# Patient Record
Sex: FEMALE | Race: WHITE | NOT HISPANIC OR LATINO
[De-identification: names, ages, dates, MRNs, and addresses within clinical notes are randomized per-mention and may not be internally consistent; named-entity substitution may affect disease eponyms.]

---

## 2017-02-17 ENCOUNTER — APPOINTMENT (OUTPATIENT)
Dept: OBGYN | Facility: CLINIC | Age: 82
End: 2017-02-17

## 2017-02-17 VITALS — DIASTOLIC BLOOD PRESSURE: 82 MMHG | SYSTOLIC BLOOD PRESSURE: 173 MMHG | HEIGHT: 59 IN

## 2017-02-17 DIAGNOSIS — E79.0 HYPERURICEMIA W/OUT SIGNS OF INFLAMMATORY ARTHRITIS AND TOPHACEOUS DISEASE: ICD-10-CM

## 2017-02-17 DIAGNOSIS — Z46.89 ENCOUNTER FOR FITTING AND ADJUSTMENT OF OTHER SPECIFIED DEVICES: ICD-10-CM

## 2017-07-07 ENCOUNTER — APPOINTMENT (OUTPATIENT)
Dept: OBGYN | Facility: CLINIC | Age: 82
End: 2017-07-07

## 2017-07-07 VITALS
HEART RATE: 75 BPM | HEIGHT: 59 IN | WEIGHT: 137 LBS | DIASTOLIC BLOOD PRESSURE: 74 MMHG | BODY MASS INDEX: 27.62 KG/M2 | SYSTOLIC BLOOD PRESSURE: 154 MMHG

## 2017-12-15 ENCOUNTER — APPOINTMENT (OUTPATIENT)
Dept: OBGYN | Facility: CLINIC | Age: 82
End: 2017-12-15
Payer: MEDICARE

## 2017-12-15 VITALS
SYSTOLIC BLOOD PRESSURE: 173 MMHG | WEIGHT: 140 LBS | HEIGHT: 59 IN | DIASTOLIC BLOOD PRESSURE: 86 MMHG | BODY MASS INDEX: 28.22 KG/M2

## 2017-12-15 DIAGNOSIS — Z87.448 PERSONAL HISTORY OF OTHER DISEASES OF URINARY SYSTEM: ICD-10-CM

## 2017-12-15 DIAGNOSIS — Z46.89 ENCOUNTER FOR FITTING AND ADJUSTMENT OF OTHER SPECIFIED DEVICES: ICD-10-CM

## 2017-12-15 PROCEDURE — 99213 OFFICE O/P EST LOW 20 MIN: CPT

## 2018-09-04 ENCOUNTER — INPATIENT (INPATIENT)
Facility: HOSPITAL | Age: 83
LOS: 0 days | Discharge: HOME | DRG: 392 | End: 2018-09-05
Attending: INTERNAL MEDICINE | Admitting: HOSPITALIST
Payer: MEDICARE

## 2018-09-04 VITALS
HEART RATE: 60 BPM | OXYGEN SATURATION: 98 % | WEIGHT: 134.92 LBS | DIASTOLIC BLOOD PRESSURE: 87 MMHG | TEMPERATURE: 98 F | HEIGHT: 59 IN | SYSTOLIC BLOOD PRESSURE: 176 MMHG | RESPIRATION RATE: 15 BRPM

## 2018-09-04 DIAGNOSIS — K21.9 GASTRO-ESOPHAGEAL REFLUX DISEASE WITHOUT ESOPHAGITIS: ICD-10-CM

## 2018-09-04 DIAGNOSIS — R11.2 NAUSEA WITH VOMITING, UNSPECIFIED: ICD-10-CM

## 2018-09-04 DIAGNOSIS — Z29.9 ENCOUNTER FOR PROPHYLACTIC MEASURES, UNSPECIFIED: ICD-10-CM

## 2018-09-04 DIAGNOSIS — G25.81 RESTLESS LEGS SYNDROME: ICD-10-CM

## 2018-09-04 DIAGNOSIS — E87.1 HYPO-OSMOLALITY AND HYPONATREMIA: ICD-10-CM

## 2018-09-04 DIAGNOSIS — I25.10 ATHEROSCLEROTIC HEART DISEASE OF NATIVE CORONARY ARTERY WITHOUT ANGINA PECTORIS: ICD-10-CM

## 2018-09-04 DIAGNOSIS — I10 ESSENTIAL (PRIMARY) HYPERTENSION: ICD-10-CM

## 2018-09-04 DIAGNOSIS — E03.9 HYPOTHYROIDISM, UNSPECIFIED: ICD-10-CM

## 2018-09-04 LAB
ALBUMIN SERPL ELPH-MCNC: 3.3 G/DL — SIGNIFICANT CHANGE UP (ref 3.3–5)
ALP SERPL-CCNC: 65 U/L — SIGNIFICANT CHANGE UP (ref 40–120)
ALT FLD-CCNC: 19 U/L — SIGNIFICANT CHANGE UP (ref 12–78)
ANION GAP SERPL CALC-SCNC: 8 MMOL/L — SIGNIFICANT CHANGE UP (ref 5–17)
APTT BLD: 24.5 SEC — LOW (ref 27.5–37.4)
AST SERPL-CCNC: 22 U/L — SIGNIFICANT CHANGE UP (ref 15–37)
BASOPHILS # BLD AUTO: 0.03 K/UL — SIGNIFICANT CHANGE UP (ref 0–0.2)
BASOPHILS NFR BLD AUTO: 0.3 % — SIGNIFICANT CHANGE UP (ref 0–2)
BILIRUB SERPL-MCNC: 0.4 MG/DL — SIGNIFICANT CHANGE UP (ref 0.2–1.2)
BUN SERPL-MCNC: 10 MG/DL — SIGNIFICANT CHANGE UP (ref 7–23)
CALCIUM SERPL-MCNC: 8.3 MG/DL — LOW (ref 8.5–10.1)
CHLORIDE SERPL-SCNC: 98 MMOL/L — SIGNIFICANT CHANGE UP (ref 96–108)
CK MB BLD-MCNC: 1.7 % — SIGNIFICANT CHANGE UP (ref 0–3.5)
CK MB CFR SERPL CALC: 1.8 NG/ML — SIGNIFICANT CHANGE UP (ref 0–3.6)
CK SERPL-CCNC: 107 U/L — SIGNIFICANT CHANGE UP (ref 26–192)
CO2 SERPL-SCNC: 23 MMOL/L — SIGNIFICANT CHANGE UP (ref 22–31)
CREAT SERPL-MCNC: 0.83 MG/DL — SIGNIFICANT CHANGE UP (ref 0.5–1.3)
EOSINOPHIL # BLD AUTO: 0.02 K/UL — SIGNIFICANT CHANGE UP (ref 0–0.5)
EOSINOPHIL NFR BLD AUTO: 0.2 % — SIGNIFICANT CHANGE UP (ref 0–6)
GLUCOSE SERPL-MCNC: 118 MG/DL — HIGH (ref 70–99)
HCT VFR BLD CALC: 33.2 % — LOW (ref 34.5–45)
HGB BLD-MCNC: 11.7 G/DL — SIGNIFICANT CHANGE UP (ref 11.5–15.5)
IMM GRANULOCYTES NFR BLD AUTO: 0.6 % — SIGNIFICANT CHANGE UP (ref 0–1.5)
INR BLD: 1.03 RATIO — SIGNIFICANT CHANGE UP (ref 0.88–1.16)
LIDOCAIN IGE QN: 125 U/L — SIGNIFICANT CHANGE UP (ref 73–393)
LYMPHOCYTES # BLD AUTO: 1.08 K/UL — SIGNIFICANT CHANGE UP (ref 1–3.3)
LYMPHOCYTES # BLD AUTO: 12.6 % — LOW (ref 13–44)
MCHC RBC-ENTMCNC: 29.8 PG — SIGNIFICANT CHANGE UP (ref 27–34)
MCHC RBC-ENTMCNC: 35.2 GM/DL — SIGNIFICANT CHANGE UP (ref 32–36)
MCV RBC AUTO: 84.5 FL — SIGNIFICANT CHANGE UP (ref 80–100)
MONOCYTES # BLD AUTO: 0.45 K/UL — SIGNIFICANT CHANGE UP (ref 0–0.9)
MONOCYTES NFR BLD AUTO: 5.2 % — SIGNIFICANT CHANGE UP (ref 2–14)
NEUTROPHILS # BLD AUTO: 6.96 K/UL — SIGNIFICANT CHANGE UP (ref 1.8–7.4)
NEUTROPHILS NFR BLD AUTO: 81.1 % — HIGH (ref 43–77)
PLATELET # BLD AUTO: 202 K/UL — SIGNIFICANT CHANGE UP (ref 150–400)
POTASSIUM SERPL-MCNC: 4.1 MMOL/L — SIGNIFICANT CHANGE UP (ref 3.5–5.3)
POTASSIUM SERPL-SCNC: 4.1 MMOL/L — SIGNIFICANT CHANGE UP (ref 3.5–5.3)
PROT SERPL-MCNC: 6.6 G/DL — SIGNIFICANT CHANGE UP (ref 6–8.3)
PROTHROM AB SERPL-ACNC: 11.2 SEC — SIGNIFICANT CHANGE UP (ref 9.8–12.7)
RBC # BLD: 3.93 M/UL — SIGNIFICANT CHANGE UP (ref 3.8–5.2)
RBC # FLD: 12.9 % — SIGNIFICANT CHANGE UP (ref 10.3–14.5)
SODIUM SERPL-SCNC: 129 MMOL/L — LOW (ref 135–145)
TROPONIN I SERPL-MCNC: <.015 NG/ML — SIGNIFICANT CHANGE UP (ref 0.01–0.04)
WBC # BLD: 8.59 K/UL — SIGNIFICANT CHANGE UP (ref 3.8–10.5)
WBC # FLD AUTO: 8.59 K/UL — SIGNIFICANT CHANGE UP (ref 3.8–10.5)

## 2018-09-04 PROCEDURE — 71045 X-RAY EXAM CHEST 1 VIEW: CPT | Mod: 26

## 2018-09-04 PROCEDURE — 99285 EMERGENCY DEPT VISIT HI MDM: CPT

## 2018-09-04 PROCEDURE — 99223 1ST HOSP IP/OBS HIGH 75: CPT | Mod: GC,AI

## 2018-09-04 PROCEDURE — 93010 ELECTROCARDIOGRAM REPORT: CPT

## 2018-09-04 RX ORDER — METOCLOPRAMIDE HCL 10 MG
10 TABLET ORAL ONCE
Qty: 0 | Refills: 0 | Status: COMPLETED | OUTPATIENT
Start: 2018-09-04 | End: 2018-09-04

## 2018-09-04 RX ORDER — ONDANSETRON 8 MG/1
4 TABLET, FILM COATED ORAL ONCE
Qty: 0 | Refills: 0 | Status: COMPLETED | OUTPATIENT
Start: 2018-09-04 | End: 2018-09-04

## 2018-09-04 RX ORDER — ASPIRIN/CALCIUM CARB/MAGNESIUM 324 MG
81 TABLET ORAL DAILY
Qty: 0 | Refills: 0 | Status: DISCONTINUED | OUTPATIENT
Start: 2018-09-04 | End: 2018-09-05

## 2018-09-04 RX ORDER — OMEGA-3 ACID ETHYL ESTERS 1 G
2 CAPSULE ORAL DAILY
Qty: 0 | Refills: 0 | Status: DISCONTINUED | OUTPATIENT
Start: 2018-09-04 | End: 2018-09-05

## 2018-09-04 RX ORDER — LEVOTHYROXINE SODIUM 125 MCG
50 TABLET ORAL
Qty: 0 | Refills: 0 | Status: DISCONTINUED | OUTPATIENT
Start: 2018-09-04 | End: 2018-09-04

## 2018-09-04 RX ORDER — LOSARTAN POTASSIUM 100 MG/1
100 TABLET, FILM COATED ORAL DAILY
Qty: 0 | Refills: 0 | Status: DISCONTINUED | OUTPATIENT
Start: 2018-09-04 | End: 2018-09-05

## 2018-09-04 RX ORDER — PANTOPRAZOLE SODIUM 20 MG/1
40 TABLET, DELAYED RELEASE ORAL
Qty: 0 | Refills: 0 | Status: DISCONTINUED | OUTPATIENT
Start: 2018-09-04 | End: 2018-09-05

## 2018-09-04 RX ORDER — LEVOTHYROXINE SODIUM 125 MCG
75 TABLET ORAL
Qty: 0 | Refills: 0 | Status: DISCONTINUED | OUTPATIENT
Start: 2018-09-05 | End: 2018-09-05

## 2018-09-04 RX ORDER — LEVOTHYROXINE SODIUM 125 MCG
50 TABLET ORAL
Qty: 0 | Refills: 0 | Status: DISCONTINUED | OUTPATIENT
Start: 2018-09-06 | End: 2018-09-05

## 2018-09-04 RX ORDER — CHOLECALCIFEROL (VITAMIN D3) 125 MCG
2000 CAPSULE ORAL DAILY
Qty: 0 | Refills: 0 | Status: DISCONTINUED | OUTPATIENT
Start: 2018-09-04 | End: 2018-09-05

## 2018-09-04 RX ORDER — ATORVASTATIN CALCIUM 80 MG/1
10 TABLET, FILM COATED ORAL AT BEDTIME
Qty: 0 | Refills: 0 | Status: DISCONTINUED | OUTPATIENT
Start: 2018-09-04 | End: 2018-09-05

## 2018-09-04 RX ORDER — SODIUM CHLORIDE 9 MG/ML
1000 INJECTION INTRAMUSCULAR; INTRAVENOUS; SUBCUTANEOUS
Qty: 0 | Refills: 0 | Status: DISCONTINUED | OUTPATIENT
Start: 2018-09-04 | End: 2018-09-05

## 2018-09-04 RX ORDER — SODIUM CHLORIDE 9 MG/ML
500 INJECTION INTRAMUSCULAR; INTRAVENOUS; SUBCUTANEOUS ONCE
Qty: 0 | Refills: 0 | Status: COMPLETED | OUTPATIENT
Start: 2018-09-04 | End: 2018-09-04

## 2018-09-04 RX ORDER — ENOXAPARIN SODIUM 100 MG/ML
40 INJECTION SUBCUTANEOUS EVERY 24 HOURS
Qty: 0 | Refills: 0 | Status: DISCONTINUED | OUTPATIENT
Start: 2018-09-04 | End: 2018-09-05

## 2018-09-04 RX ORDER — METOCLOPRAMIDE HCL 10 MG
10 TABLET ORAL EVERY 6 HOURS
Qty: 0 | Refills: 0 | Status: DISCONTINUED | OUTPATIENT
Start: 2018-09-04 | End: 2018-09-05

## 2018-09-04 RX ORDER — LEVOTHYROXINE SODIUM 125 MCG
75 TABLET ORAL
Qty: 0 | Refills: 0 | Status: DISCONTINUED | OUTPATIENT
Start: 2018-09-04 | End: 2018-09-04

## 2018-09-04 RX ORDER — FAMOTIDINE 10 MG/ML
20 INJECTION INTRAVENOUS ONCE
Qty: 0 | Refills: 0 | Status: COMPLETED | OUTPATIENT
Start: 2018-09-04 | End: 2018-09-04

## 2018-09-04 RX ADMIN — LOSARTAN POTASSIUM 100 MILLIGRAM(S): 100 TABLET, FILM COATED ORAL at 23:42

## 2018-09-04 RX ADMIN — SODIUM CHLORIDE 50 MILLILITER(S): 9 INJECTION INTRAMUSCULAR; INTRAVENOUS; SUBCUTANEOUS at 23:36

## 2018-09-04 RX ADMIN — FAMOTIDINE 20 MILLIGRAM(S): 10 INJECTION INTRAVENOUS at 18:39

## 2018-09-04 RX ADMIN — SODIUM CHLORIDE 1000 MILLILITER(S): 9 INJECTION INTRAMUSCULAR; INTRAVENOUS; SUBCUTANEOUS at 18:39

## 2018-09-04 RX ADMIN — SODIUM CHLORIDE 500 MILLILITER(S): 9 INJECTION INTRAMUSCULAR; INTRAVENOUS; SUBCUTANEOUS at 22:12

## 2018-09-04 RX ADMIN — ONDANSETRON 4 MILLIGRAM(S): 8 TABLET, FILM COATED ORAL at 20:50

## 2018-09-04 RX ADMIN — Medication 10 MILLIGRAM(S): at 22:43

## 2018-09-04 RX ADMIN — ONDANSETRON 4 MILLIGRAM(S): 8 TABLET, FILM COATED ORAL at 18:39

## 2018-09-04 RX ADMIN — ATORVASTATIN CALCIUM 10 MILLIGRAM(S): 80 TABLET, FILM COATED ORAL at 23:40

## 2018-09-04 NOTE — ED PROVIDER NOTE - OBJECTIVE STATEMENT
84y F hx of CAD w stents, PPM, HTN, HLD, GERD, hypothyroid here with chest pain and nausea. Pt states onset of sx around 2:30AM today. Describes as pressure like, 6/10, intermittent, non radiating. Pain lasts seconds at a time. Pain assoc with nausea which has been constant and vomiting x2 NBNB. No SOB. No Diaphoresis. No diarrhea. No F/C. Last BM was yesterday.   PCP- Dina Sharif- [place]

## 2018-09-04 NOTE — H&P ADULT - PROBLEM SELECTOR PLAN 9
IMPROVE VTE Individual Risk Assessment  RISK                                                                Points  [  ] Previous VTE                                                  3  [  ] Thrombophilia                                               2  [  ] Lower limb paralysis                                      2        (unable to hold up >15 seconds)    [  ] Current Cancer                                              2         (within 6 months)  [  ] Immobilization > 24 hrs                                1  [  ] ICU/CCU stay > 24 hours                              1  [ x ] Age > 60                                                      1  IMPROVE VTE Score ____1_____    lovenox for vte ppx

## 2018-09-04 NOTE — ED ADULT NURSE NOTE - OBJECTIVE STATEMENT
Assumed patient care @ 1830. Pt received sitting on stretcher in no apparent distress, paced on monitor. Pt AOx3 C/O 5/10 midsternal chest pain that comes and goes since 0230 but becoming worse associated with nausea and 2 episodes of vomiting. Pacemaker to left sided chest. Lungs clear to ausculation, respirations even unlabored. Abd soft non tender, + bowel sounds x 4quadrants. Denies fever, chills, Diarrhea. Skin warm, dry, color appropriate for age and race.

## 2018-09-04 NOTE — H&P ADULT - NSHPREVIEWOFSYSTEMS_GEN_ALL_CORE
Constitutional: denies fever, chills, diaphoresis   Respiratory: denies SOB, MARIE, cough, sputum production, wheezing  Cardiovascular: denies palpitations, edema, + CP  Gastrointestinal: + nausea, + vomiting, denies diarrhea, constipation, abdominal pain   Genitourinary: denies dysuria, frequency   Skin/Breast: denies rash, itching  Musculoskeletal: denies joint swelling, muscle weakness, + restless legs  Neurologic: + headache, + lightheadedness, denies paresthesias, numbness/tingling    ROS negative except as noted above

## 2018-09-04 NOTE — ED PROVIDER NOTE - MEDICAL DECISION MAKING DETAILS
4y F hx of CAD w stents, PPM, HTN, HLD, GERD, hypothyroid here with chest pain and nausea. eval for cardiac vs GERD. Labs, meds, CXR, EKG, re-eval.

## 2018-09-04 NOTE — H&P ADULT - ASSESSMENT
Pt is a 85 yo F with PMH of CAD with stent x1, pacemaker, HLD, HTN, hypothyroidism, GERD presents with CP, nausea and lightheadedness. Admitted for chest pain r/o ACS Pt is a 83 yo F with PMH of CAD with stent x1, pacemaker, HLD, HTN, hypothyroidism, GERD presents with CP, nausea and lightheadedness. Admitted for chest pain evaluate for ACS.

## 2018-09-04 NOTE — ED PROVIDER NOTE - CARE PLAN
Principal Discharge DX:	Hyponatremia  Secondary Diagnosis:	Nausea and vomiting  Secondary Diagnosis:	Chest pain

## 2018-09-04 NOTE — H&P ADULT - HISTORY OF PRESENT ILLNESS
Pt is a 85 yo F with PMH of     In ED labs significant for Na 129, otherwise grossly normal. 1st set of troponins negative.  Pt given iv pepcid 20mg, iv zofran 4mg x2, and 500mL NS bolus.  EKG  CXR - no active disease Pt is a 83 yo F with PMH of CAD with stent x1, pacemaker, HLD, HTN, hypothyroidism, GERD presents with CP, nausea and lightheadedness. Pt states that her symptoms began this morning. The CP and the lightheadedness were on and off, however the nausea was constant. Pt states nothing made the symptoms better or worse. CP is non-radiating and retrosternal and described as a pressure. Pt states that she felt CP similar to this before she had the pacemaker placed. Pt states she has had minimal vomitus, mostly phlegm/spit up. Pt denies fever, chills, diaphoresis, palpitations, edema, SOB, cough, wheezing, feeling like the room is spinning.     In ED labs significant for Na 129, otherwise grossly normal. 1st set of troponins negative.  Pt given iv pepcid 20mg, iv zofran 4mg x2, iv reglan 10mg, and 500mL NS bolus.  EKG paced at 65  CXR - no active disease Pt is a 83 yo F with PMH of CAD with stent x1, pacemaker, HLD, HTN, hypothyroidism, GERD presents with CP, nausea and lightheadedness. Pt states that her symptoms began this morning. The CP and the lightheadedness were on and off, however the nausea was constant. Pt states nothing made the symptoms better or worse. CP is non-radiating and retrosternal and described as a pressure. Pt states that she felt CP similar to this before she had the pacemaker placed. Pt states she has had minimal vomitus, mostly phlegm/spit up. Pt denies fever, chills, diaphoresis, palpitations, edema, SOB, cough, wheezing, feeling like the room is spinning.     In ED labs significant for Na 129, otherwise grossly normal. 1st set of troponins negative.  Pt given iv pepcid 20mg, iv zofran 4mg x2, iv reglan 10mg, and 500mL NS bolus.  EKG paced at 65  CXR - no active disease      no family history of MI reported

## 2018-09-04 NOTE — H&P ADULT - PROBLEM SELECTOR PLAN 2
Na 129 on admission  gentle fluid hydration  f/u BMP Na 129 on admission, poss sec to vomiting  gentle fluid hydration  f/u BMP

## 2018-09-04 NOTE — H&P ADULT - NSHPPHYSICALEXAM_GEN_ALL_CORE
Physical Exam:  General: Well developed, well nourished, elderly female in NAD  HEENT: NCAT, EOMI bl, moist mucous membranes, anicteric sclera  Neurology: A&Ox3, nonfocal, moves all extremities   Respiratory: CTA B/L, non-labored breathing  CV: regular, +S1/S2, no murmurs  Abdominal: Soft, NT, ND +BSx4  Extremities: No C/C/E, + peripheral pulses  MSK: no joint erythema or warmth, no joint swelling   Skin: warm, dry, normal color

## 2018-09-04 NOTE — ED PROVIDER NOTE - PMH
CAD (coronary artery disease)  1 stent and PPM  GERD (gastroesophageal reflux disease)    HLD (hyperlipidemia)    HTN (hypertension)    Hypothyroid

## 2018-09-04 NOTE — H&P ADULT - ATTENDING COMMENTS
83 yo f pmh stated above here for chest pain and N/V.  Unclear etiology of chest pain, will evaluate for ACS.  Trend [place]. EKG didn't reveal any acute ST changes.  Cardio consulted.   Hyponatremia, likely from N/V.  Gentle hydration.  Anti-emetics PRN.

## 2018-09-04 NOTE — ED PROVIDER NOTE - PHYSICAL EXAMINATION
Gen: WNWD NAD  HEENT: NCAT  EOMI normal pharynx  Neck: supple  CV: RRR, no murmur  Lung: CTA BL  Abd: +BS soft NTND neg ragland   Ext: wwp, palp pulses, FROMx4, no cce  Neuro: CN grossly intact, sensation intact, motor 5/5 throughout

## 2018-09-04 NOTE — ED ADULT NURSE NOTE - NSIMPLEMENTINTERV_GEN_ALL_ED
Implemented All Fall with Harm Risk Interventions:  [place] to call system. Call bell, personal items and telephone within reach. Instruct patient to call for assistance. Room bathroom lighting operational. Non-slip footwear when patient is off stretcher. Physically safe environment: no spills, clutter or unnecessary equipment. Stretcher in lowest position, wheels locked, appropriate side rails in place. Provide visual cue, wrist band, yellow gown, etc. Monitor gait and stability. Monitor for mental status changes and reorient to person, place, and time. Review medications for side effects contributing to fall risk. Reinforce activity limits and safety measures with patient and family. Provide visual clues: red socks.

## 2018-09-04 NOTE — H&P ADULT - NSHPSOCIALHISTORY_GEN_ALL_CORE
Lives with daughter  Ambulates with cane    EtOH - socially, 1 beer on the weekends  Tobacco - denies

## 2018-09-05 ENCOUNTER — TRANSCRIPTION ENCOUNTER (OUTPATIENT)
Age: 83
End: 2018-09-05

## 2018-09-05 VITALS
TEMPERATURE: 98 F | SYSTOLIC BLOOD PRESSURE: 149 MMHG | RESPIRATION RATE: 16 BRPM | OXYGEN SATURATION: 96 % | DIASTOLIC BLOOD PRESSURE: 54 MMHG | HEART RATE: 79 BPM

## 2018-09-05 DIAGNOSIS — R07.89 OTHER CHEST PAIN: ICD-10-CM

## 2018-09-05 LAB
ANION GAP SERPL CALC-SCNC: 10 MMOL/L — SIGNIFICANT CHANGE UP (ref 5–17)
ANION GAP SERPL CALC-SCNC: 7 MMOL/L — SIGNIFICANT CHANGE UP (ref 5–17)
APPEARANCE UR: CLEAR — SIGNIFICANT CHANGE UP
BASOPHILS # BLD AUTO: 0.02 K/UL — SIGNIFICANT CHANGE UP (ref 0–0.2)
BASOPHILS NFR BLD AUTO: 0.2 % — SIGNIFICANT CHANGE UP (ref 0–2)
BILIRUB UR-MCNC: NEGATIVE — SIGNIFICANT CHANGE UP
BUN SERPL-MCNC: 10 MG/DL — SIGNIFICANT CHANGE UP (ref 7–23)
BUN SERPL-MCNC: 8 MG/DL — SIGNIFICANT CHANGE UP (ref 7–23)
CALCIUM SERPL-MCNC: 8.5 MG/DL — SIGNIFICANT CHANGE UP (ref 8.5–10.1)
CALCIUM SERPL-MCNC: 8.6 MG/DL — SIGNIFICANT CHANGE UP (ref 8.5–10.1)
CHLORIDE SERPL-SCNC: 101 MMOL/L — SIGNIFICANT CHANGE UP (ref 96–108)
CHLORIDE SERPL-SCNC: 98 MMOL/L — SIGNIFICANT CHANGE UP (ref 96–108)
CK MB BLD-MCNC: 1.8 % — SIGNIFICANT CHANGE UP (ref 0–3.5)
CK MB CFR SERPL CALC: 2.2 NG/ML — SIGNIFICANT CHANGE UP (ref 0–3.6)
CK SERPL-CCNC: 120 U/L — SIGNIFICANT CHANGE UP (ref 26–192)
CO2 SERPL-SCNC: 21 MMOL/L — LOW (ref 22–31)
CO2 SERPL-SCNC: 24 MMOL/L — SIGNIFICANT CHANGE UP (ref 22–31)
COLOR SPEC: YELLOW — SIGNIFICANT CHANGE UP
CREAT SERPL-MCNC: 0.75 MG/DL — SIGNIFICANT CHANGE UP (ref 0.5–1.3)
CREAT SERPL-MCNC: 0.86 MG/DL — SIGNIFICANT CHANGE UP (ref 0.5–1.3)
DIFF PNL FLD: ABNORMAL
EOSINOPHIL # BLD AUTO: 0.04 K/UL — SIGNIFICANT CHANGE UP (ref 0–0.5)
EOSINOPHIL NFR BLD AUTO: 0.5 % — SIGNIFICANT CHANGE UP (ref 0–6)
GLUCOSE SERPL-MCNC: 101 MG/DL — HIGH (ref 70–99)
GLUCOSE SERPL-MCNC: 126 MG/DL — HIGH (ref 70–99)
GLUCOSE UR QL: NEGATIVE — SIGNIFICANT CHANGE UP
HCT VFR BLD CALC: 31.4 % — LOW (ref 34.5–45)
HGB BLD-MCNC: 11.2 G/DL — LOW (ref 11.5–15.5)
IMM GRANULOCYTES NFR BLD AUTO: 0.5 % — SIGNIFICANT CHANGE UP (ref 0–1.5)
KETONES UR-MCNC: NEGATIVE — SIGNIFICANT CHANGE UP
LEUKOCYTE ESTERASE UR-ACNC: NEGATIVE — SIGNIFICANT CHANGE UP
LYMPHOCYTES # BLD AUTO: 1.46 K/UL — SIGNIFICANT CHANGE UP (ref 1–3.3)
LYMPHOCYTES # BLD AUTO: 17.3 % — SIGNIFICANT CHANGE UP (ref 13–44)
MAGNESIUM SERPL-MCNC: 1.6 MG/DL — SIGNIFICANT CHANGE UP (ref 1.6–2.6)
MAGNESIUM SERPL-MCNC: 1.8 MG/DL — SIGNIFICANT CHANGE UP (ref 1.6–2.6)
MCHC RBC-ENTMCNC: 29.9 PG — SIGNIFICANT CHANGE UP (ref 27–34)
MCHC RBC-ENTMCNC: 35.7 GM/DL — SIGNIFICANT CHANGE UP (ref 32–36)
MCV RBC AUTO: 83.7 FL — SIGNIFICANT CHANGE UP (ref 80–100)
MONOCYTES # BLD AUTO: 0.64 K/UL — SIGNIFICANT CHANGE UP (ref 0–0.9)
MONOCYTES NFR BLD AUTO: 7.6 % — SIGNIFICANT CHANGE UP (ref 2–14)
NEUTROPHILS # BLD AUTO: 6.22 K/UL — SIGNIFICANT CHANGE UP (ref 1.8–7.4)
NEUTROPHILS NFR BLD AUTO: 73.9 % — SIGNIFICANT CHANGE UP (ref 43–77)
NITRITE UR-MCNC: NEGATIVE — SIGNIFICANT CHANGE UP
OSMOLALITY SERPL: 260 MOS/KG — LOW (ref 275–300)
OSMOLALITY UR: 330 MOS/KG — SIGNIFICANT CHANGE UP (ref 50–1200)
PH UR: 6.5 — SIGNIFICANT CHANGE UP (ref 5–8)
PLATELET # BLD AUTO: 193 K/UL — SIGNIFICANT CHANGE UP (ref 150–400)
POTASSIUM SERPL-MCNC: 3.9 MMOL/L — SIGNIFICANT CHANGE UP (ref 3.5–5.3)
POTASSIUM SERPL-MCNC: 3.9 MMOL/L — SIGNIFICANT CHANGE UP (ref 3.5–5.3)
POTASSIUM SERPL-SCNC: 3.9 MMOL/L — SIGNIFICANT CHANGE UP (ref 3.5–5.3)
POTASSIUM SERPL-SCNC: 3.9 MMOL/L — SIGNIFICANT CHANGE UP (ref 3.5–5.3)
POTASSIUM UR-SCNC: 31 MMOL/L — SIGNIFICANT CHANGE UP
PROT UR-MCNC: 25 MG/DL
RBC # BLD: 3.75 M/UL — LOW (ref 3.8–5.2)
RBC # FLD: 12.9 % — SIGNIFICANT CHANGE UP (ref 10.3–14.5)
SODIUM SERPL-SCNC: 129 MMOL/L — LOW (ref 135–145)
SODIUM SERPL-SCNC: 132 MMOL/L — LOW (ref 135–145)
SODIUM UR-SCNC: 89 MMOL/L — SIGNIFICANT CHANGE UP
SP GR SPEC: 1.01 — SIGNIFICANT CHANGE UP (ref 1.01–1.02)
TROPONIN I SERPL-MCNC: <.015 NG/ML — SIGNIFICANT CHANGE UP (ref 0.01–0.04)
UROBILINOGEN FLD QL: NEGATIVE — SIGNIFICANT CHANGE UP
WBC # BLD: 8.42 K/UL — SIGNIFICANT CHANGE UP (ref 3.8–10.5)
WBC # FLD AUTO: 8.42 K/UL — SIGNIFICANT CHANGE UP (ref 3.8–10.5)

## 2018-09-05 PROCEDURE — 83930 ASSAY OF BLOOD OSMOLALITY: CPT

## 2018-09-05 PROCEDURE — 83540 ASSAY OF IRON: CPT

## 2018-09-05 PROCEDURE — 36415 COLL VENOUS BLD VENIPUNCTURE: CPT

## 2018-09-05 PROCEDURE — 93306 TTE W/DOPPLER COMPLETE: CPT

## 2018-09-05 PROCEDURE — 99239 HOSP IP/OBS DSCHRG MGMT >30: CPT | Mod: 25

## 2018-09-05 PROCEDURE — 85027 COMPLETE CBC AUTOMATED: CPT

## 2018-09-05 PROCEDURE — 84300 ASSAY OF URINE SODIUM: CPT

## 2018-09-05 PROCEDURE — 83935 ASSAY OF URINE OSMOLALITY: CPT

## 2018-09-05 PROCEDURE — 82550 ASSAY OF CK (CPK): CPT

## 2018-09-05 PROCEDURE — 82553 CREATINE MB FRACTION: CPT

## 2018-09-05 PROCEDURE — 87086 URINE CULTURE/COLONY COUNT: CPT

## 2018-09-05 PROCEDURE — 84133 ASSAY OF URINE POTASSIUM: CPT

## 2018-09-05 PROCEDURE — 83735 ASSAY OF MAGNESIUM: CPT

## 2018-09-05 PROCEDURE — 96375 TX/PRO/DX INJ NEW DRUG ADDON: CPT

## 2018-09-05 PROCEDURE — 87186 SC STD MICRODIL/AGAR DIL: CPT

## 2018-09-05 PROCEDURE — 96376 TX/PRO/DX INJ SAME DRUG ADON: CPT

## 2018-09-05 PROCEDURE — 99285 EMERGENCY DEPT VISIT HI MDM: CPT | Mod: 25

## 2018-09-05 PROCEDURE — 80048 BASIC METABOLIC PNL TOTAL CA: CPT

## 2018-09-05 PROCEDURE — 85610 PROTHROMBIN TIME: CPT

## 2018-09-05 PROCEDURE — 81001 URINALYSIS AUTO W/SCOPE: CPT

## 2018-09-05 PROCEDURE — 93005 ELECTROCARDIOGRAM TRACING: CPT

## 2018-09-05 PROCEDURE — 71045 X-RAY EXAM CHEST 1 VIEW: CPT

## 2018-09-05 PROCEDURE — 80053 COMPREHEN METABOLIC PANEL: CPT

## 2018-09-05 PROCEDURE — 84484 ASSAY OF TROPONIN QUANT: CPT

## 2018-09-05 PROCEDURE — 83690 ASSAY OF LIPASE: CPT

## 2018-09-05 PROCEDURE — 96374 THER/PROPH/DIAG INJ IV PUSH: CPT

## 2018-09-05 PROCEDURE — 85730 THROMBOPLASTIN TIME PARTIAL: CPT

## 2018-09-05 RX ADMIN — ENOXAPARIN SODIUM 40 MILLIGRAM(S): 100 INJECTION SUBCUTANEOUS at 05:47

## 2018-09-05 RX ADMIN — Medication 2000 UNIT(S): at 11:10

## 2018-09-05 RX ADMIN — PANTOPRAZOLE SODIUM 40 MILLIGRAM(S): 20 TABLET, DELAYED RELEASE ORAL at 07:50

## 2018-09-05 RX ADMIN — Medication 2 GRAM(S): at 11:10

## 2018-09-05 RX ADMIN — Medication 81 MILLIGRAM(S): at 11:10

## 2018-09-05 RX ADMIN — Medication 75 MICROGRAM(S): at 05:47

## 2018-09-05 RX ADMIN — LOSARTAN POTASSIUM 100 MILLIGRAM(S): 100 TABLET, FILM COATED ORAL at 05:47

## 2018-09-05 NOTE — PROGRESS NOTE ADULT - ASSESSMENT
Pt is a 85 yo F with PMH of CAD with stent x1, pacemaker, HLD, HTN, hypothyroidism, GERD presents with CP, nausea and lightheadedness. Admitted for chest pain evaluate for ACS.

## 2018-09-05 NOTE — CONSULT NOTE ADULT - SUBJECTIVE AND OBJECTIVE BOX
CHIEF COMPLAINT: Patient is a 84y old  Female who presents with a chief complaint of chest pain (04 Sep 2018 21:52)      HPI:  Pt is a 83 yo F with PMH of CAD with stent x1, pacemaker, HLD, HTN, hypothyroidism, GERD presents with CP, nausea and lightheadedness. Pt states that her symptoms began this morning. The CP and the lightheadedness were on and off, however the nausea was constant. Pt states nothing made the symptoms better or worse. CP is non-radiating and retrosternal and described as a pressure. Pt states that she felt CP similar to this before she had the pacemaker placed. Pt states she has had minimal vomitus, mostly phlegm/spit up. Pt denies fever, chills, diaphoresis, palpitations, edema, SOB, cough, wheezing, feeling like the room is spinning.     In ED labs significant for Na 129, otherwise grossly normal. 1st set of troponins negative.  Pt given iv pepcid 20mg, iv zofran 4mg x2, iv reglan 10mg, and 500mL NS bolus.  EKG paced at 65  CXR - no active disease      no family history of MI reported (04 Sep 2018 21:52)      PAST MEDICAL & SURGICAL HISTORY:  Hypothyroid  GERD (gastroesophageal reflux disease)  HLD (hyperlipidemia)  HTN (hypertension)  CAD (coronary artery disease): 1 stent and PPM  No significant past surgical history      SOCIAL HISTORY:    FAMILY HISTORY: FAMILY HISTORY:  No pertinent family history in first degree relatives      MEDICATIONS  (STANDING):  aspirin enteric coated 81 milliGRAM(s) Oral daily  atorvastatin 10 milliGRAM(s) Oral at bedtime  cholecalciferol 2000 Unit(s) Oral daily  enoxaparin Injectable 40 milliGRAM(s) SubCutaneous every 24 hours  levothyroxine 75 MICROGram(s) Oral <User Schedule>  losartan 100 milliGRAM(s) Oral daily  omega-3-Acid Ethyl Esters 2 Gram(s) Oral daily  pantoprazole    Tablet 40 milliGRAM(s) Oral before breakfast  sodium chloride 0.9%. 1000 milliLiter(s) (50 mL/Hr) IV Continuous <Continuous>    MEDICATIONS  (PRN):  metoclopramide Injectable 10 milliGRAM(s) IV Push every 6 hours PRN Nausea and/or Vomiting      Allergies    No Known Allergies    Intolerances        REVIEW OF SYSTEMS:    CONSTITUTIONAL: No weakness, fevers or chills  EYES: No visual changes, No diplopia  ENMT: No throat pain , No exudate  NECK: No pain or stiffness  RESPIRATORY: No cough, wheezing, hemoptysis; No shortness of breath  CARDIOVASCULAR: No chest pain or palpitations  GASTROINTESTINAL: No abdominal pain. No nausea, vomiting, or hematemesis; No diarrhea or constipation. No melena or hematochezia.  GENITOURINARY: No dysuria, frequency or hematuria  NEUROLOGICAL: No numbness or weakness  SKIN: No itching or rash  All other review of systems is negative unless indicated above    VITAL SIGNS:   Vital Signs Last 24 Hrs  T(C): 36.4 (05 Sep 2018 05:43), Max: 36.8 (04 Sep 2018 18:05)  T(F): 97.5 (05 Sep 2018 05:43), Max: 98.3 (04 Sep 2018 18:05)  HR: 64 (05 Sep 2018 05:43) (60 - 78)  BP: 147/59 (05 Sep 2018 05:43) (147/59 - 176/87)  BP(mean): --  RR: 14 (05 Sep 2018 05:43) (14 - 18)  SpO2: 94% (05 Sep 2018 05:43) (94% - 98%)    I&O's Summary      PHYSICAL EXAM:    Constitutional: NAD, awake and alert, well-developed  Eyes:  EOMI,  Pupils round, no lesions  ENMT: no exudate or erythema  Pulmonary: Non-labored, breath sounds are clear bilaterally, No wheezing, rales or rhonchi  Cardiovascular: PMI not palpable non-displaced Regular S1 and S2, no murmurs, rubs, gallops or clicks  Gastrointestinal: Bowel Sounds present, soft, nontender.   Lymph: No peripheral edema. No cervical lymphadenopathy.  Neurological: Alert, no focal deficits  Skin: No rashes. Changes of chronic venous stasis. No cyanosis.  Psych:  Mood & affect appropriate Confused.    LABS: All Labs Reviewed:                        11.2   8.42  )-----------( 193      ( 05 Sep 2018 04:26 )             31.4                         11.7   8.59  )-----------( 202      ( 04 Sep 2018 20:47 )             33.2     05 Sep 2018 04:26    132    |  101    |  8      ----------------------------<  101    3.9     |  24     |  0.75   05 Sep 2018 00:26    129    |  98     |  10     ----------------------------<  126    3.9     |  21     |  0.86   04 Sep 2018 20:47    129    |  98     |  10     ----------------------------<  118    4.1     |  23     |  0.83     Ca    8.6        05 Sep 2018 04:26  Ca    8.5        05 Sep 2018 00:26  Ca    8.3        04 Sep 2018 20:47  Mg     1.6       05 Sep 2018 04:28  Mg     1.8       05 Sep 2018 00:26    TPro  6.6    /  Alb  3.3    /  TBili  0.4    /  DBili  x      /  AST  22     /  ALT  19     /  AlkPhos  65     04 Sep 2018 20:47    PT/INR - ( 04 Sep 2018 20:47 )   PT: 11.2 sec;   INR: 1.03 ratio         PTT - ( 04 Sep 2018 20:47 )  PTT:24.5 sec  CARDIAC MARKERS ( 05 Sep 2018 00:26 )  <.015 ng/mL / x     / 120 U/L / x     / 2.2 ng/mL  CARDIAC MARKERS ( 04 Sep 2018 20:47 )  <.015 ng/mL / x     / 107 U/L / x     / 1.8 ng/mL      Blood Culture:         RADIOLOGY/EKG:

## 2018-09-05 NOTE — DISCHARGE NOTE ADULT - MEDICATION SUMMARY - MEDICATIONS TO TAKE
I will START or STAY ON the medications listed below when I get home from the hospital:    aspirin 81 mg oral tablet  -- 1 tab(s) by mouth once a day  -- Indication: For CAD (coronary artery disease)    losartan 100 mg oral tablet  -- 1 tab(s) by mouth once a day  -- Indication: For HTN (hypertension)    lovastatin 40 mg oral tablet  -- 1 tab(s) by mouth once a day  -- Indication: For HLD (hyperlipidemia)    Fish Oil 1200 mg oral capsule  -- 1 cap(s) by mouth once a day  -- Indication: For HLD (hyperlipidemia)    PriLOSEC 20 mg oral delayed release capsule  -- 1 cap(s) by mouth once a day  -- Indication: For GERD (gastroesophageal reflux disease)    levothyroxine 50 mcg (0.05 mg) oral tablet  -- 1 tab(s) by mouth every other day  -- Indication: For Hypothyroid    levothyroxine 75 mcg (0.075 mg) oral tablet  -- 1 tab(s) by mouth every other day  -- Indication: For Hypothyroid    Vitamin D3 2000 intl units oral tablet  -- 1 tab(s) by mouth once a day  -- Indication: For Vitamin/mineral

## 2018-09-05 NOTE — PROGRESS NOTE ADULT - PROBLEM SELECTOR PLAN 4
Possibly causing atypical chest pain symptoms, exacerbated by dietary habits as above  - continue ppi

## 2018-09-05 NOTE — DISCHARGE NOTE ADULT - HOSPITAL COURSE
Pt is a 85 yo F with PMH of CAD with stent x1, pacemaker, HLD, HTN, hypothyroidism, GERD presented to ED with CP, nausea and lightheadedness.  Her symptoms began this morning. The CP and the lightheadedness were on and off, however the nausea was constant. Pt states nothing made the symptoms better or worse. CP is non-radiating and retrosternal and described as a pressure. Pt states that she felt CP similar to this before she had the pacemaker placed. Pt stated she has had minimal vomitus, mostly phlegm/spit up. Pt denied fever, chills, diaphoresis, palpitations, edema, SOB, cough, wheezing, feeling like the room is spinning. Admitted for chest pain, r/o ACS. Cardio consulted (William), cardiac enzymes x2 negative, TTE ordered, showed______. Atypical chest pain per cardio, for outpatient stress test. Found to be hyponatremic on presentation, treated with gentle hydration with normal saline. Hyponatremia improved over hospital course.  Reglan prn started for nausea, which resolved once chest pain resolved over hospital course. Pt responded well with above treatment with resolution of chest pain and hyponatremia. Pt seen and examined day of discharge, medically optimized for discharge home with close follow up with cardiology (William) outpatient for stress testing. Pt is a 85 yo F with PMH of CAD with stent x1, pacemaker, HLD, HTN, hypothyroidism, GERD presented to ED with CP, nausea and lightheadedness.  Her symptoms began this morning. The CP and the lightheadedness were on and off, however the nausea was constant. Pt states nothing made the symptoms better or worse. CP is non-radiating and retrosternal and described as a pressure. Pt states that she felt CP similar to this before she had the pacemaker placed. Pt stated she has had minimal vomitus, mostly phlegm/spit up. Pt denied fever, chills, diaphoresis, palpitations, edema, SOB, cough, wheezing, feeling like the room is spinning. Admitted for chest pain, r/o ACS. Cardio consulted (William), cardiac enzymes x2 negative, TTE ordered, pending result to follow with Cardio. Atypical chest pain per cardio, for outpatient stress test. Found to be hyponatremic on presentation, treated with gentle hydration with normal saline. Hyponatremia improved over hospital course.  Reglan prn started for nausea, which resolved once chest pain resolved over hospital course. Pt responded well with above treatment with resolution of chest pain and hyponatremia. Pt seen and examined day of discharge, medically optimized for discharge home with close follow up with cardiology (William) outpatient for stress testing.  Vital Signs Last 24 Hrs  T(C): 36.7 (05 Sep 2018 10:18), Max: 36.8 (04 Sep 2018 18:05)  T(F): 98.1 (05 Sep 2018 10:18), Max: 98.3 (04 Sep 2018 18:05)  HR: 79 (05 Sep 2018 10:18) (60 - 79)  BP: 149/54 (05 Sep 2018 10:18) (147/59 - 176/87)  BP(mean): --  RR: 16 (05 Sep 2018 10:18) (14 - 18)  SpO2: 96% (05 Sep 2018 10:18) (94% - 98%)    PMD notified.

## 2018-09-05 NOTE — DISCHARGE NOTE ADULT - CARE PROVIDER_API CALL
Pedro Arroyo (), Internal Medicine  [address] [place] [address]  Phone: [phone]  Fax: [phone]    Gus Thomas (MD), Cardiovascular Disease  [address] [place] [address]  Phone: [phone]  Fax: [phone]

## 2018-09-05 NOTE — DISCHARGE NOTE ADULT - INSTRUCTIONS
Low salt, low cholesterol diet, please have 3 solid meals everyday, please do not only eat crackers and tea daily

## 2018-09-05 NOTE — PROGRESS NOTE ADULT - PROBLEM SELECTOR PLAN 2
Improved on gentle NS hydration, likely 2/2 "tea and toast" diet, previous episodes in the past with similar presenting conditions  - continue gentle fluid hydration  - trend bmp while here

## 2018-09-05 NOTE — ED ADULT NURSE REASSESSMENT NOTE - NS ED NURSE REASSESS COMMENT FT1
Pt remained on the monitor with sinus rhythm, walk with assistance to the bathroom. awaiting labs in the morning. no acute distress noted. will continue to monitor
Assumed care for pt awake alert and oriented x 3 with family at bedside. Vss, afebrile. Pt feel nauseous, Zofran and Reglan given as ordered. Awaiting bed assignment. will continue to monitor

## 2018-09-05 NOTE — CONSULT NOTE ADULT - ASSESSMENT
Atypical chest pain  ·	No ECG changes  ·	Cardiac enzymes x 2 negative  ·	Transthoracic echo  ·	Ambulate with assistance  ·	If echo without new wall motion abnormalities would discharge home and follow up with pharmacologic stress test.

## 2018-09-05 NOTE — PROGRESS NOTE ADULT - PROBLEM SELECTOR PLAN 1
Given hx provided by daughter, pt experienced similar episodes in the past concurrent also with hyponatremia when pt goes "tea and toast" diet for couple days.  Likely reflux pain in nature, doubt cardiac, cp now resolved  - cardiac enzymesx2 negative   - spoke with pt at length regarding dietary modifications to avoid further episodes  - cardio consult noted, for outpt stress test  - f/u TTE  - d/c planning

## 2018-09-05 NOTE — DISCHARGE NOTE ADULT - SECONDARY DIAGNOSIS.
GERD (gastroesophageal reflux disease) CAD (coronary artery disease) HTN (hypertension) Hyponatremia Hypothyroid HLD (hyperlipidemia)

## 2018-09-05 NOTE — DISCHARGE NOTE ADULT - PATIENT PORTAL LINK FT
You can access the [facility] Patient Portal, offered by [facility], by registering with the following website: [email or web address]

## 2018-09-05 NOTE — DISCHARGE NOTE ADULT - CARE PLAN
Principal Discharge DX:	Atypical chest pain  Goal:	Resolution  Assessment and plan of treatment:	You were admitted for chest pain. It was likely caused due to reflux and gastric symptoms which can present in your midsternal region.  This was likely exacerbated by your "tea and toast" diet of which you experienced similar episodes in the past while on this diet.  Please refrain from this diet in the future  Please follow up with the cardiologist after discharge for outpatient stress testing  Continue statin and aspirin  Secondary Diagnosis:	GERD (gastroesophageal reflux disease)  Assessment and plan of treatment:	Continue Prilosec  Secondary Diagnosis:	CAD (coronary artery disease)  Assessment and plan of treatment:	Continue statin and asa  Secondary Diagnosis:	HTN (hypertension)  Goal:	BP control  Assessment and plan of treatment:	You had elevated blood pressure on admission likely caused by the acute atypical chest pain you experienced.  Continue losartan  Secondary Diagnosis:	Hyponatremia  Goal:	Resolution  Assessment and plan of treatment:	You had low sodium during your hospital stay like cause by dietary concerns as per above.  Please have regular scheduled solid meals daily, avoid only crackers and tea everyday  Secondary Diagnosis:	Hypothyroid  Assessment and plan of treatment:	Continue home regimen synthroid  Secondary Diagnosis:	HLD (hyperlipidemia)  Assessment and plan of treatment:	Continue statin

## 2018-09-05 NOTE — DISCHARGE NOTE ADULT - PLAN OF CARE
Resolution You were admitted for chest pain. It was likely caused due to reflux and gastric symptoms which can present in your midsternal region.  This was likely exacerbated by your "tea and toast" diet of which you experienced similar episodes in the past while on this diet.  Please refrain from this diet in the future  Please follow up with the cardiologist after discharge for outpatient stress testing  Continue statin and aspirin Continue Prilosec Continue statin and asa BP control You had elevated blood pressure on admission likely caused by the acute atypical chest pain you experienced.  Continue losartan You had low sodium during your hospital stay like cause by dietary concerns as per above.  Please have regular scheduled solid meals daily, avoid only crackers and tea everyday Continue home regimen synthroid Continue statin

## 2018-09-07 LAB
-  AMIKACIN: SIGNIFICANT CHANGE UP
-  AMOXICILLIN/CLAVULANIC ACID: SIGNIFICANT CHANGE UP
-  AMPICILLIN/SULBACTAM: SIGNIFICANT CHANGE UP
-  AMPICILLIN: SIGNIFICANT CHANGE UP
-  AZTREONAM: SIGNIFICANT CHANGE UP
-  CEFAZOLIN: SIGNIFICANT CHANGE UP
-  CEFEPIME: SIGNIFICANT CHANGE UP
-  CEFOXITIN: SIGNIFICANT CHANGE UP
-  CEFTRIAXONE: SIGNIFICANT CHANGE UP
-  CIPROFLOXACIN: SIGNIFICANT CHANGE UP
-  ERTAPENEM: SIGNIFICANT CHANGE UP
-  GENTAMICIN: SIGNIFICANT CHANGE UP
-  LEVOFLOXACIN: SIGNIFICANT CHANGE UP
-  MEROPENEM: SIGNIFICANT CHANGE UP
-  NITROFURANTOIN: SIGNIFICANT CHANGE UP
-  PIPERACILLIN/TAZOBACTAM: SIGNIFICANT CHANGE UP
-  TOBRAMYCIN: SIGNIFICANT CHANGE UP
-  TRIMETHOPRIM/SULFAMETHOXAZOLE: SIGNIFICANT CHANGE UP
CULTURE RESULTS: SIGNIFICANT CHANGE UP
METHOD TYPE: SIGNIFICANT CHANGE UP
ORGANISM # SPEC MICROSCOPIC CNT: SIGNIFICANT CHANGE UP
ORGANISM # SPEC MICROSCOPIC CNT: SIGNIFICANT CHANGE UP
SPECIMEN SOURCE: SIGNIFICANT CHANGE UP

## 2018-10-31 PROBLEM — K21.9 GASTRO-ESOPHAGEAL REFLUX DISEASE WITHOUT ESOPHAGITIS: Chronic | Status: ACTIVE | Noted: 2018-09-04

## 2018-10-31 PROBLEM — E78.5 HYPERLIPIDEMIA, UNSPECIFIED: Chronic | Status: ACTIVE | Noted: 2018-09-04

## 2018-10-31 PROBLEM — E03.9 HYPOTHYROIDISM, UNSPECIFIED: Chronic | Status: ACTIVE | Noted: 2018-09-04

## 2018-10-31 PROBLEM — I10 ESSENTIAL (PRIMARY) HYPERTENSION: Chronic | Status: ACTIVE | Noted: 2018-09-04

## 2018-11-02 ENCOUNTER — APPOINTMENT (OUTPATIENT)
Dept: OBGYN | Facility: CLINIC | Age: 83
End: 2018-11-02
Payer: MEDICARE

## 2018-11-02 VITALS — SYSTOLIC BLOOD PRESSURE: 164 MMHG | BODY MASS INDEX: 26.46 KG/M2 | WEIGHT: 131 LBS | DIASTOLIC BLOOD PRESSURE: 77 MMHG

## 2018-11-02 DIAGNOSIS — N81.4 UTEROVAGINAL PROLAPSE, UNSPECIFIED: ICD-10-CM

## 2018-11-02 PROCEDURE — 57150 TREAT VAGINA INFECTION: CPT

## 2019-11-05 ENCOUNTER — APPOINTMENT (OUTPATIENT)
Dept: OBGYN | Facility: CLINIC | Age: 84
End: 2019-11-05
Payer: MEDICARE

## 2019-11-05 VITALS — BODY MASS INDEX: 25.85 KG/M2 | DIASTOLIC BLOOD PRESSURE: 68 MMHG | WEIGHT: 128 LBS | SYSTOLIC BLOOD PRESSURE: 156 MMHG

## 2019-11-05 DIAGNOSIS — Z87.448 PERSONAL HISTORY OF OTHER DISEASES OF URINARY SYSTEM: ICD-10-CM

## 2019-11-05 PROCEDURE — 57150 TREAT VAGINA INFECTION: CPT

## 2020-07-07 ENCOUNTER — APPOINTMENT (OUTPATIENT)
Dept: OBGYN | Facility: CLINIC | Age: 85
End: 2020-07-07
Payer: MEDICARE

## 2020-07-07 VITALS
HEIGHT: 59 IN | WEIGHT: 125 LBS | BODY MASS INDEX: 25.2 KG/M2 | SYSTOLIC BLOOD PRESSURE: 153 MMHG | DIASTOLIC BLOOD PRESSURE: 69 MMHG

## 2020-07-07 PROCEDURE — 99213 OFFICE O/P EST LOW 20 MIN: CPT

## 2020-07-20 ENCOUNTER — RECORD ABSTRACTING (OUTPATIENT)
Age: 85
End: 2020-07-20

## 2020-07-22 DIAGNOSIS — R39.9 UNSPECIFIED SYMPTOMS AND SIGNS INVOLVING THE GENITOURINARY SYSTEM: ICD-10-CM

## 2020-07-27 ENCOUNTER — APPOINTMENT (OUTPATIENT)
Dept: OBGYN | Facility: CLINIC | Age: 85
End: 2020-07-27
Payer: MEDICARE

## 2020-07-27 PROCEDURE — 81002 URINALYSIS NONAUTO W/O SCOPE: CPT | Mod: NC

## 2020-07-30 LAB — BACTERIA UR CULT: NORMAL

## 2020-08-28 ENCOUNTER — APPOINTMENT (OUTPATIENT)
Dept: OBGYN | Facility: CLINIC | Age: 85
End: 2020-08-28
Payer: MEDICARE

## 2020-08-28 DIAGNOSIS — Z46.89 ENCOUNTER FOR FITTING AND ADJUSTMENT OF OTHER SPECIFIED DEVICES: ICD-10-CM

## 2020-08-28 PROCEDURE — 99213 OFFICE O/P EST LOW 20 MIN: CPT

## 2020-08-28 NOTE — CHIEF COMPLAINT
[FreeTextEntry1] : Jose Cruz had a gelhorn and painful removing every 3 mos and inserting. Out a ring in today and put up high enough, She has a large cystocele and she is 86 and ring reduced the cystocele

## 2022-07-19 ENCOUNTER — INPATIENT (INPATIENT)
Facility: HOSPITAL | Age: 87
LOS: 3 days | Discharge: HOME | DRG: 371 | End: 2022-07-23
Attending: STUDENT IN AN ORGANIZED HEALTH CARE EDUCATION/TRAINING PROGRAM | Admitting: STUDENT IN AN ORGANIZED HEALTH CARE EDUCATION/TRAINING PROGRAM
Payer: MEDICARE

## 2022-07-19 VITALS
SYSTOLIC BLOOD PRESSURE: 90 MMHG | TEMPERATURE: 97 F | HEART RATE: 69 BPM | RESPIRATION RATE: 98 BRPM | HEIGHT: 59 IN | DIASTOLIC BLOOD PRESSURE: 43 MMHG

## 2022-07-19 LAB
BASOPHILS # BLD AUTO: 0.03 K/UL — SIGNIFICANT CHANGE UP (ref 0–0.2)
BASOPHILS NFR BLD AUTO: 0.2 % — SIGNIFICANT CHANGE UP (ref 0–2)
EOSINOPHIL # BLD AUTO: 0.05 K/UL — SIGNIFICANT CHANGE UP (ref 0–0.5)
EOSINOPHIL NFR BLD AUTO: 0.3 % — SIGNIFICANT CHANGE UP (ref 0–6)
HCT VFR BLD CALC: 38.1 % — SIGNIFICANT CHANGE UP (ref 34.5–45)
HGB BLD-MCNC: 11.8 G/DL — SIGNIFICANT CHANGE UP (ref 11.5–15.5)
IMM GRANULOCYTES NFR BLD AUTO: 0.4 % — SIGNIFICANT CHANGE UP (ref 0–1.5)
LYMPHOCYTES # BLD AUTO: 0.52 K/UL — LOW (ref 1–3.3)
LYMPHOCYTES # BLD AUTO: 2.9 % — LOW (ref 13–44)
MCHC RBC-ENTMCNC: 26.1 PG — LOW (ref 27–34)
MCHC RBC-ENTMCNC: 31 GM/DL — LOW (ref 32–36)
MCV RBC AUTO: 84.3 FL — SIGNIFICANT CHANGE UP (ref 80–100)
MONOCYTES # BLD AUTO: 1.53 K/UL — HIGH (ref 0–0.9)
MONOCYTES NFR BLD AUTO: 8.5 % — SIGNIFICANT CHANGE UP (ref 2–14)
NEUTROPHILS # BLD AUTO: 15.79 K/UL — HIGH (ref 1.8–7.4)
NEUTROPHILS NFR BLD AUTO: 87.7 % — HIGH (ref 43–77)
NRBC # BLD: 0 /100 WBCS — SIGNIFICANT CHANGE UP (ref 0–0)
OB PNL STL: POSITIVE
PLATELET # BLD AUTO: 326 K/UL — SIGNIFICANT CHANGE UP (ref 150–400)
RBC # BLD: 4.52 M/UL — SIGNIFICANT CHANGE UP (ref 3.8–5.2)
RBC # FLD: 15.2 % — HIGH (ref 10.3–14.5)
WBC # BLD: 18.02 K/UL — HIGH (ref 3.8–10.5)
WBC # FLD AUTO: 18.02 K/UL — HIGH (ref 3.8–10.5)

## 2022-07-19 PROCEDURE — 93010 ELECTROCARDIOGRAM REPORT: CPT

## 2022-07-19 PROCEDURE — 99285 EMERGENCY DEPT VISIT HI MDM: CPT

## 2022-07-19 RX ORDER — OMEGA-3 ACID ETHYL ESTERS 1 G
1 CAPSULE ORAL
Qty: 0 | Refills: 0 | DISCHARGE

## 2022-07-19 RX ORDER — SODIUM CHLORIDE 9 MG/ML
1000 INJECTION INTRAMUSCULAR; INTRAVENOUS; SUBCUTANEOUS ONCE
Refills: 0 | Status: COMPLETED | OUTPATIENT
Start: 2022-07-19 | End: 2022-07-19

## 2022-07-19 RX ORDER — PANTOPRAZOLE SODIUM 20 MG/1
40 TABLET, DELAYED RELEASE ORAL ONCE
Refills: 0 | Status: COMPLETED | OUTPATIENT
Start: 2022-07-19 | End: 2022-07-19

## 2022-07-19 RX ADMIN — SODIUM CHLORIDE 1000 MILLILITER(S): 9 INJECTION INTRAMUSCULAR; INTRAVENOUS; SUBCUTANEOUS at 23:00

## 2022-07-19 NOTE — ED ADULT TRIAGE NOTE - CHIEF COMPLAINT QUOTE
Patient A/Ox4 BIBA for bright red blood in stool that began tonight. Hx of UC. IVF initiated in EMS. . Patient A/Ox4 BIBA for bright red blood in stool that began tonight. Also endorses nausea, vomiting and ABD pain. Hx of UC. IVF initiated in EMS. .

## 2022-07-19 NOTE — ED ADULT NURSE NOTE - CHIEF COMPLAINT QUOTE
Patient A/Ox4 BIBA for bright red blood in stool that began tonight. Also endorses nausea, vomiting and ABD pain. Hx of UC. IVF initiated in EMS. .

## 2022-07-19 NOTE — ED ADULT NURSE NOTE - NSIMPLEMENTINTERV_GEN_ALL_ED
Implemented All Fall with Harm Risk Interventions:  [place] to call system. Call bell, personal items and telephone within reach. Instruct patient to call for assistance. Room bathroom lighting operational. Non-slip footwear when patient is off stretcher. Physically safe environment: no spills, clutter or unnecessary equipment. Stretcher in lowest position, wheels locked, appropriate side rails in place. Provide visual cue, wrist band, yellow gown, etc. Monitor gait and stability. Monitor for mental status changes and reorient to person, place, and time. Review medications for side effects contributing to fall risk. Reinforce activity limits and safety measures with patient and family. Provide visual clues: red socks.

## 2022-07-20 DIAGNOSIS — E03.9 HYPOTHYROIDISM, UNSPECIFIED: ICD-10-CM

## 2022-07-20 DIAGNOSIS — D72.829 ELEVATED WHITE BLOOD CELL COUNT, UNSPECIFIED: ICD-10-CM

## 2022-07-20 DIAGNOSIS — Z29.9 ENCOUNTER FOR PROPHYLACTIC MEASURES, UNSPECIFIED: ICD-10-CM

## 2022-07-20 DIAGNOSIS — N39.0 URINARY TRACT INFECTION, SITE NOT SPECIFIED: ICD-10-CM

## 2022-07-20 DIAGNOSIS — N17.9 ACUTE KIDNEY FAILURE, UNSPECIFIED: ICD-10-CM

## 2022-07-20 DIAGNOSIS — I95.9 HYPOTENSION, UNSPECIFIED: ICD-10-CM

## 2022-07-20 DIAGNOSIS — I25.10 ATHEROSCLEROTIC HEART DISEASE OF NATIVE CORONARY ARTERY WITHOUT ANGINA PECTORIS: ICD-10-CM

## 2022-07-20 DIAGNOSIS — K92.2 GASTROINTESTINAL HEMORRHAGE, UNSPECIFIED: ICD-10-CM

## 2022-07-20 DIAGNOSIS — I10 ESSENTIAL (PRIMARY) HYPERTENSION: ICD-10-CM

## 2022-07-20 LAB
ALBUMIN SERPL ELPH-MCNC: 2.9 G/DL — LOW (ref 3.3–5)
ALBUMIN SERPL ELPH-MCNC: 3 G/DL — LOW (ref 3.3–5)
ALBUMIN SERPL ELPH-MCNC: 3.3 G/DL — SIGNIFICANT CHANGE UP (ref 3.3–5)
ALP SERPL-CCNC: 51 U/L — SIGNIFICANT CHANGE UP (ref 40–120)
ALP SERPL-CCNC: 52 U/L — SIGNIFICANT CHANGE UP (ref 40–120)
ALP SERPL-CCNC: 61 U/L — SIGNIFICANT CHANGE UP (ref 40–120)
ALT FLD-CCNC: 13 U/L — SIGNIFICANT CHANGE UP (ref 12–78)
ALT FLD-CCNC: 16 U/L — SIGNIFICANT CHANGE UP (ref 12–78)
ALT FLD-CCNC: 17 U/L — SIGNIFICANT CHANGE UP (ref 12–78)
ANION GAP SERPL CALC-SCNC: 10 MMOL/L — SIGNIFICANT CHANGE UP (ref 5–17)
ANION GAP SERPL CALC-SCNC: 11 MMOL/L — SIGNIFICANT CHANGE UP (ref 5–17)
APTT BLD: 25.2 SEC — LOW (ref 27.5–35.5)
AST SERPL-CCNC: 20 U/L — SIGNIFICANT CHANGE UP (ref 15–37)
AST SERPL-CCNC: 20 U/L — SIGNIFICANT CHANGE UP (ref 15–37)
AST SERPL-CCNC: 29 U/L — SIGNIFICANT CHANGE UP (ref 15–37)
BASOPHILS # BLD AUTO: 0.02 K/UL — SIGNIFICANT CHANGE UP (ref 0–0.2)
BASOPHILS NFR BLD AUTO: 0.1 % — SIGNIFICANT CHANGE UP (ref 0–2)
BILIRUB DIRECT SERPL-MCNC: <0.1 MG/DL — SIGNIFICANT CHANGE UP (ref 0–0.3)
BILIRUB INDIRECT FLD-MCNC: >0.3 MG/DL — SIGNIFICANT CHANGE UP (ref 0.2–1)
BILIRUB SERPL-MCNC: 0.2 MG/DL — SIGNIFICANT CHANGE UP (ref 0.2–1.2)
BILIRUB SERPL-MCNC: 0.4 MG/DL — SIGNIFICANT CHANGE UP (ref 0.2–1.2)
BILIRUB SERPL-MCNC: 0.4 MG/DL — SIGNIFICANT CHANGE UP (ref 0.2–1.2)
BLD GP AB SCN SERPL QL: SIGNIFICANT CHANGE UP
BUN SERPL-MCNC: 18 MG/DL — SIGNIFICANT CHANGE UP (ref 7–23)
BUN SERPL-MCNC: 18 MG/DL — SIGNIFICANT CHANGE UP (ref 7–23)
CALCIUM SERPL-MCNC: 8.2 MG/DL — LOW (ref 8.5–10.1)
CALCIUM SERPL-MCNC: 8.6 MG/DL — SIGNIFICANT CHANGE UP (ref 8.5–10.1)
CHLORIDE SERPL-SCNC: 113 MMOL/L — HIGH (ref 96–108)
CHLORIDE SERPL-SCNC: 116 MMOL/L — HIGH (ref 96–108)
CO2 SERPL-SCNC: 16 MMOL/L — LOW (ref 22–31)
CO2 SERPL-SCNC: 20 MMOL/L — LOW (ref 22–31)
CREAT SERPL-MCNC: 1 MG/DL — SIGNIFICANT CHANGE UP (ref 0.5–1.3)
CREAT SERPL-MCNC: 1.2 MG/DL — SIGNIFICANT CHANGE UP (ref 0.5–1.3)
CREAT SERPL-MCNC: 1.6 MG/DL — HIGH (ref 0.5–1.3)
EGFR: 31 ML/MIN/1.73M2 — LOW
EGFR: 44 ML/MIN/1.73M2 — LOW
EGFR: 54 ML/MIN/1.73M2 — LOW
EOSINOPHIL # BLD AUTO: 0.02 K/UL — SIGNIFICANT CHANGE UP (ref 0–0.5)
EOSINOPHIL NFR BLD AUTO: 0.1 % — SIGNIFICANT CHANGE UP (ref 0–6)
GLUCOSE SERPL-MCNC: 134 MG/DL — HIGH (ref 70–99)
GLUCOSE SERPL-MCNC: 159 MG/DL — HIGH (ref 70–99)
HCT VFR BLD CALC: 33.8 % — LOW (ref 34.5–45)
HGB BLD-MCNC: 11 G/DL — LOW (ref 11.5–15.5)
IMM GRANULOCYTES NFR BLD AUTO: 0.4 % — SIGNIFICANT CHANGE UP (ref 0–1.5)
INR BLD: 1.06 RATIO — SIGNIFICANT CHANGE UP (ref 0.88–1.16)
INR BLD: 1.14 RATIO — SIGNIFICANT CHANGE UP (ref 0.88–1.16)
IRON SATN MFR SERPL: 10 % — LOW (ref 14–50)
IRON SATN MFR SERPL: 29 UG/DL — LOW (ref 30–160)
LYMPHOCYTES # BLD AUTO: 0.23 K/UL — LOW (ref 1–3.3)
LYMPHOCYTES # BLD AUTO: 1.6 % — LOW (ref 13–44)
MCHC RBC-ENTMCNC: 26.6 PG — LOW (ref 27–34)
MCHC RBC-ENTMCNC: 32.5 GM/DL — SIGNIFICANT CHANGE UP (ref 32–36)
MCV RBC AUTO: 81.8 FL — SIGNIFICANT CHANGE UP (ref 80–100)
MONOCYTES # BLD AUTO: 1.07 K/UL — HIGH (ref 0–0.9)
MONOCYTES NFR BLD AUTO: 7.3 % — SIGNIFICANT CHANGE UP (ref 2–14)
NEUTROPHILS # BLD AUTO: 13.31 K/UL — HIGH (ref 1.8–7.4)
NEUTROPHILS NFR BLD AUTO: 90.5 % — HIGH (ref 43–77)
NRBC # BLD: 0 /100 WBCS — SIGNIFICANT CHANGE UP (ref 0–0)
PLATELET # BLD AUTO: 263 K/UL — SIGNIFICANT CHANGE UP (ref 150–400)
POTASSIUM SERPL-MCNC: 3.3 MMOL/L — LOW (ref 3.5–5.3)
POTASSIUM SERPL-MCNC: 4.1 MMOL/L — SIGNIFICANT CHANGE UP (ref 3.5–5.3)
POTASSIUM SERPL-SCNC: 3.3 MMOL/L — LOW (ref 3.5–5.3)
POTASSIUM SERPL-SCNC: 4.1 MMOL/L — SIGNIFICANT CHANGE UP (ref 3.5–5.3)
PROT SERPL-MCNC: 5.7 G/DL — LOW (ref 6–8.3)
PROT SERPL-MCNC: 5.8 G/DL — LOW (ref 6–8.3)
PROT SERPL-MCNC: 6.6 G/DL — SIGNIFICANT CHANGE UP (ref 6–8.3)
PROTHROM AB SERPL-ACNC: 12.4 SEC — SIGNIFICANT CHANGE UP (ref 10.5–13.4)
PROTHROM AB SERPL-ACNC: 13.3 SEC — SIGNIFICANT CHANGE UP (ref 10.5–13.4)
RBC # BLD: 4.13 M/UL — SIGNIFICANT CHANGE UP (ref 3.8–5.2)
RBC # FLD: 15.1 % — HIGH (ref 10.3–14.5)
SARS-COV-2 RNA SPEC QL NAA+PROBE: DETECTED
SODIUM SERPL-SCNC: 143 MMOL/L — SIGNIFICANT CHANGE UP (ref 135–145)
SODIUM SERPL-SCNC: 143 MMOL/L — SIGNIFICANT CHANGE UP (ref 135–145)
TIBC SERPL-MCNC: 288 UG/DL — SIGNIFICANT CHANGE UP (ref 220–430)
TRANSFERRIN SERPL-MCNC: 238 MG/DL — SIGNIFICANT CHANGE UP (ref 200–360)
UIBC SERPL-MCNC: 259 UG/DL — SIGNIFICANT CHANGE UP (ref 110–370)
WBC # BLD: 14.71 K/UL — HIGH (ref 3.8–10.5)
WBC # FLD AUTO: 14.71 K/UL — HIGH (ref 3.8–10.5)

## 2022-07-20 PROCEDURE — 99223 1ST HOSP IP/OBS HIGH 75: CPT | Mod: GC

## 2022-07-20 PROCEDURE — 71045 X-RAY EXAM CHEST 1 VIEW: CPT | Mod: 26

## 2022-07-20 PROCEDURE — 74176 CT ABD & PELVIS W/O CONTRAST: CPT | Mod: 26,MA

## 2022-07-20 RX ORDER — PANTOPRAZOLE SODIUM 20 MG/1
40 TABLET, DELAYED RELEASE ORAL
Refills: 0 | Status: DISCONTINUED | OUTPATIENT
Start: 2022-07-20 | End: 2022-07-22

## 2022-07-20 RX ORDER — SODIUM CHLORIDE 9 MG/ML
1000 INJECTION INTRAMUSCULAR; INTRAVENOUS; SUBCUTANEOUS
Refills: 0 | Status: DISCONTINUED | OUTPATIENT
Start: 2022-07-20 | End: 2022-07-21

## 2022-07-20 RX ORDER — LEVOTHYROXINE SODIUM 125 MCG
50 TABLET ORAL
Refills: 0 | Status: DISCONTINUED | OUTPATIENT
Start: 2022-07-20 | End: 2022-07-23

## 2022-07-20 RX ORDER — LOPERAMIDE HCL 2 MG
2 TABLET ORAL ONCE
Refills: 0 | Status: DISCONTINUED | OUTPATIENT
Start: 2022-07-20 | End: 2022-07-23

## 2022-07-20 RX ORDER — CEFTRIAXONE 500 MG/1
1000 INJECTION, POWDER, FOR SOLUTION INTRAMUSCULAR; INTRAVENOUS EVERY 24 HOURS
Refills: 0 | Status: COMPLETED | OUTPATIENT
Start: 2022-07-20 | End: 2022-07-22

## 2022-07-20 RX ORDER — REMDESIVIR 5 MG/ML
INJECTION INTRAVENOUS
Refills: 0 | Status: DISCONTINUED | OUTPATIENT
Start: 2022-07-20 | End: 2022-07-20

## 2022-07-20 RX ORDER — LOPERAMIDE HCL 2 MG
2 TABLET ORAL ONCE
Refills: 0 | Status: DISCONTINUED | OUTPATIENT
Start: 2022-07-20 | End: 2022-07-20

## 2022-07-20 RX ORDER — POTASSIUM CHLORIDE 20 MEQ
40 PACKET (EA) ORAL ONCE
Refills: 0 | Status: COMPLETED | OUTPATIENT
Start: 2022-07-20 | End: 2022-07-20

## 2022-07-20 RX ORDER — ATORVASTATIN CALCIUM 80 MG/1
10 TABLET, FILM COATED ORAL AT BEDTIME
Refills: 0 | Status: DISCONTINUED | OUTPATIENT
Start: 2022-07-20 | End: 2022-07-23

## 2022-07-20 RX ORDER — LEVOTHYROXINE SODIUM 125 MCG
75 TABLET ORAL
Refills: 0 | Status: DISCONTINUED | OUTPATIENT
Start: 2022-07-21 | End: 2022-07-23

## 2022-07-20 RX ORDER — SODIUM CHLORIDE 9 MG/ML
1000 INJECTION INTRAMUSCULAR; INTRAVENOUS; SUBCUTANEOUS
Refills: 0 | Status: DISCONTINUED | OUTPATIENT
Start: 2022-07-20 | End: 2022-07-20

## 2022-07-20 RX ORDER — REMDESIVIR 5 MG/ML
200 INJECTION INTRAVENOUS EVERY 24 HOURS
Refills: 0 | Status: DISCONTINUED | OUTPATIENT
Start: 2022-07-20 | End: 2022-07-20

## 2022-07-20 RX ADMIN — SODIUM CHLORIDE 75 MILLILITER(S): 9 INJECTION INTRAMUSCULAR; INTRAVENOUS; SUBCUTANEOUS at 14:17

## 2022-07-20 RX ADMIN — CEFTRIAXONE 100 MILLIGRAM(S): 500 INJECTION, POWDER, FOR SOLUTION INTRAMUSCULAR; INTRAVENOUS at 06:47

## 2022-07-20 RX ADMIN — PANTOPRAZOLE SODIUM 40 MILLIGRAM(S): 20 TABLET, DELAYED RELEASE ORAL at 16:26

## 2022-07-20 RX ADMIN — ATORVASTATIN CALCIUM 10 MILLIGRAM(S): 80 TABLET, FILM COATED ORAL at 22:17

## 2022-07-20 RX ADMIN — Medication 40 MILLIEQUIVALENT(S): at 13:13

## 2022-07-20 RX ADMIN — PANTOPRAZOLE SODIUM 40 MILLIGRAM(S): 20 TABLET, DELAYED RELEASE ORAL at 06:47

## 2022-07-20 RX ADMIN — SODIUM CHLORIDE 75 MILLILITER(S): 9 INJECTION INTRAMUSCULAR; INTRAVENOUS; SUBCUTANEOUS at 03:06

## 2022-07-20 RX ADMIN — PANTOPRAZOLE SODIUM 40 MILLIGRAM(S): 20 TABLET, DELAYED RELEASE ORAL at 01:00

## 2022-07-20 NOTE — ED ADULT NURSE REASSESSMENT NOTE - NSIMPLEMENTINTERV_GEN_ALL_ED
Implemented All Universal Safety Interventions:  [place] to call system. Call bell, personal items and telephone within reach. Instruct patient to call for assistance. Room bathroom lighting operational. Non-slip footwear when patient is off stretcher. Physically safe environment: no spills, clutter or unnecessary equipment. Stretcher in lowest position, wheels locked, appropriate side rails in place.

## 2022-07-20 NOTE — H&P ADULT - NSHPSOCIALHISTORY_GEN_ALL_CORE
Tobacco: denies  EtOH:  denies  Recreational drug use:  denies  Lives with: daughter at home  Ambulates: with a cane  ADLs: independent  Vaccinations: denies COVID vacc

## 2022-07-20 NOTE — ED PROVIDER NOTE - CLINICAL SUMMARY MEDICAL DECISION MAKING FREE TEXT BOX
88 year old female p/w abdominal pain, vomiting, and 3 episodes of bloody stools tonight.  Noted to be hypotensive on scene.  +Bloody stool on exam. Check labs, H/H, type and screen, hydrate, obtain CT abd and pelvis, and admit for GI consultation

## 2022-07-20 NOTE — H&P ADULT - PROBLEM SELECTOR PLAN 1
Patient presents with abdominal pain, BRBPR, fatigue likely 2/2 GIB  - Hgb 11.8 on admission, wnl  - FOBT +  - CT a/p: Sigmoid diverticulosis without evidence of diverticulitis. Mild fluid distention of the colon which may be reflective of diarrhea. Moderate-sized sliding hiatus hernia.  - NPO for now  - Protonix 40 mg IVP BID  - Hold home ASA  - SCDs, hold pharmacologic DVT ppx  - Currently hemodynamically stable, monitor for signs and symptoms of active bleeding  - F/u vitamin B12, folate, iron panel: iron, ferritin, TIBC, transferrin  - GI (Dr. Morales) consulted, f/u recs

## 2022-07-20 NOTE — ED PROVIDER NOTE - OBJECTIVE STATEMENT
88 year old female with a history of PM, CAD with 2 stents, HLD, HTN presents with 3 episodes of BRBPR.  Patient BIBA, noted to be hypotensive on the scene and fluid bolus started en route.  Daughter at bedside states patient has been weak with poor PO intake for 1 week.  She was seen at [facility] ED 4 days ago, received IV fluids and was told she had a mild UTI, sent home from ED.  Today, she saw her PMD, was started on Cefpodoxime 100mg.  She took 1 dose and then developed generalized abdominal pain, V x 1 non-bloody and 3 episodes of BRBPR.  Patient has a history of UC.  Tested positive for COVID on 7/7.  SHe uses ASA daily, no other anticoagulation.  PMD Dr. Arroyo 88 year old female with a history of PM, CAD with 2 stents, HLD, HTN presents with 3 episodes of BRBPR.  Patient BIBA, noted to be hypotensive on the scene and fluid bolus started en route.  Daughter at bedside states patient has been weak with poor PO intake for 1 week.  She was seen at [facility] ED 4 days ago, received IV fluids and was told she had a mild UTI, sent home from ED.  Today, she saw her PMD, was started on Cefpodoxime 100mg.  She took 1 dose and then developed generalized abdominal pain, V x 1 non-bloody and 3 episodes of BRBPR.  Patient has a history of UC.  Tested positive for COVID on 7/7.  She uses ASA daily, no other anticoagulation.  PMD Dr. Arroyo

## 2022-07-20 NOTE — H&P ADULT - ASSESSMENT
88 year old female with PMHx of CAD s/p stents (2002), s/p PPM (2016), Hypertension, Hyperlipidemia, Hypothyroidism, hearing loss admitted for GIB.

## 2022-07-20 NOTE — ED PROVIDER NOTE - GASTROINTESTINAL, MLM
Abdomen soft, non-tender, no rebound, no guarding. +BS, no CVA tenderness, no pulsatile mass.  On rectal exam, good tone,  Bright red blood from rectum mixed with liquid brown stool.  No external hemorrhoids, no fissure

## 2022-07-20 NOTE — H&P ADULT - NSHPPHYSICALEXAM_GEN_ALL_CORE
VITALS:   T(C): 36.2 (07-19-22 @ 22:53), Max: 36.2 (07-19-22 @ 22:53)  HR: 97 (07-20-22 @ 00:36) (69 - 97)  BP: 114/55 (07-20-22 @ 00:36) (90/43 - 114/55)  RR: 18 (07-20-22 @ 00:36) (18 - 98)  SpO2: 98% (07-20-22 @ 00:36) (98% - 98%)    GENERAL: mildly uncomfortable 2/2 pain, hard of hearing  HEAD:  Atraumatic, Normocephalic  EYES: EOMI, conjunctiva and sclera clear  ENT: dry mucous membranes  CHEST/LUNG: Clear to auscultation bilaterally; Unlabored respirations  HEART: S1, S2; Regular rate and rhythm  ABDOMEN: tenderness to palpation diffusely, no guarding, soft, nondistended  EXTREMITIES: No clubbing, cyanosis or contractures  NERVOUS SYSTEM: AAOx3, appropriately responsive to questions and commands

## 2022-07-20 NOTE — PROGRESS NOTE ADULT - ASSESSMENT
88 year old female with PMHx of CAD s/p stents (2002), s/p PPM (2016), Hypertension, Hyperlipidemia, Hypothyroidism, hearing loss BIBA from home c/o BRBPR that began this evening. History is supplemented by patient's daughter (Magno) at bedside. Patient presented to [facility] ED 4 days ago c/o weakness and decreased PO intake after recent COVID dx July 7/5 after attending a gathering with people with COVID. She was diagnosed with a mild UTI at that time and was discharged from the ED s/p IVF.     RECOMMENDATIONS  1-COVID - clarified from daughter that this is actually day 15 of illness so past acute phase and would agree with no antiviral at this point-supportive care only and as past second week very low risk of decompensation if history accurate also not clear that isolation required    2-UTI/Pyelonephritis - with leukocytosis, systemic symptoms, noted UA from 7/15 with mod LE, pos nitrates and pyuria, not seeing micro data so agree with ceftriaxone for now with recs to follow.    3-other issues coordinate with medicine    Thank you for consulting us and involving us in the management of this most interesting and challenging case.  We will follow along in the care of this patient. Please call us at [phone] or text me directly on my cell# at [phone] with any concerns.       88 year old female with PMHx of CAD s/p stents (2002), s/p PPM (2016), Hypertension, Hyperlipidemia, Hypothyroidism, hearing loss BIBA from home c/o BRBPR that began this evening. History is supplemented by patient's daughter (Magno) at bedside. Patient presented to [facility] ED 4 days ago c/o weakness and decreased PO intake after recent COVID dx early July after attending a gathering with people with COVID. She was diagnosed with a mild UTI at that time and was discharged from the ED s/p IVF.     RECOMMENDATIONS  1-COVID - clarified from daughter and on review of [facility] hospital visit looks like positive COVID dated as 7/7 and treated with Paxlovid so not in first week of illness so past acute phase and would agree with no antiviral at this point-supportive care only and as past second week very low risk of decompensation if history accurate also not clear that isolation required    2-UTI/Pyelonephritis - with leukocytosis, systemic symptoms, urine culture from [facility] collection w Proteus Mirabilis relatively pansensitive only resistant to macrobid, noted UA from 7/15 with mod LE, pos nitrates and pyuria, not seeing micro data so agree with ceftriaxone for now with recs to follow.    3-other issues coordinate with medicine    Thank you for consulting us and involving us in the management of this most interesting and challenging case.  We will follow along in the care of this patient. Please call us at [phone] or text me directly on my cell# at [phone] with any concerns.

## 2022-07-20 NOTE — ED ADULT NURSE REASSESSMENT NOTE - NS ED NURSE REASSESS COMMENT FT1
Pt received in bed alert and oriented and resting in bed. Pt denies any pain or discomfort as of this time. Pt admitted and awaiting bed placement. Pt maintained on isolation for COVID19. Nursing care ongoing and safety maintained

## 2022-07-20 NOTE — H&P ADULT - HISTORY OF PRESENT ILLNESS
88 year old female with PMHx of CAD s/p stents (2002), s/p PPM (2016), Hypertension, Hyperlipidemia, Hypothyroidism, hearing loss BIBA from home c/o BRBPR that began this evening. History is supplemented by patient's daughter (Magno) at bedside. Patient presented to [facility] ED 4 days ago c/o weakness and decreased PO intake after recent COVID dx at the beginning of July. She was diagnosed with a mild UTI at that time and was discharged from the ED s/p IVF. Earlier this evening, patient presented to PMD office c/o persistent weakness and was prescribed Cefpodoxime for UTI. Patient took one dose of Cefpodoxime and subsequently felt nauseous, vomited x 2-3 and noticed BRBPR when she went to the bathroom. She denies blood in her vomit and is unable to quantify the amount of blood in her stool. She admits to diffuse achy abdominal pain. Denies fever/chills, headache, dizziness, shortness of breath, chest pain. Of note, patient's daughter states she has had bouts of colitis in the past with associated BRBPR for which she was never hospitalized.    ED course:  Vitals: T 97.2F, HR 69, BP 90/43 -> 114/55  Labs: FOBT +, WBC 18.02, PTT 25.2, Cl 113, CO2 20, Cr 1.6, gluc 134, GFR 31  CT a/p: Sigmoid diverticulosis without evidence of diverticulitis. Mild fluid distention of the colon which may be reflective of diarrhea.Moderate-sized sliding hiatus hernia.  Given: IV Protonix x1, 1L IV NS x1

## 2022-07-20 NOTE — H&P ADULT - PROBLEM SELECTOR PLAN 2
Patient was dx with UTI at [facility] ED 4 days ago. She visited [facility] today and was started on Cefpodoxime 100mg qd Patient was dx with UTI at [facility] ED 4 days ago. She visited PCP today and was prescribed Cefpodoxime  - Monitor off abx for now Patient was dx with UTI at [facility] ED 4 days ago. She visited PCP today and was prescribed Cefpodoxime  - Afebrile, denies urinary sx at present  - Monitor off abx for now Patient was dx with mild UTI at [facility] ED 4 days ago, but was not started on abx. She visited PCP today and was prescribed Cefpodoxime  - Afebrile, denies urinary sx at present  - WBC 18.02  - Monitor off abx for now  - ID (Dr. Travis) consulted, f/u recs - Patient was dx with mild UTI at [facility] ED 4 days ago, but was not started on abx. She visited PCP today and was prescribed Cefpodoxime  - Patient was dx with COVID on 7/7  - Afebrile, denies urinary sx at present  - WBC 18.02  - Monitor off abx for now  - ID (Dr. Travis) consulted, f/u recs - WBC 18.02  - Patient was dx with mild UTI at [facility] ED 4 days ago, but was not started on abx. She visited PCP today and was prescribed Cefpodoxime  - Patient was dx with COVID on 7/7  - Afebrile, denies urinary sx and URI sx at present  - Monitor off abx for now  - ID (Dr. Travis) consulted, f/u recs Patient was dx with mild UTI at [facility] ED 4 days ago, not started on abx. She visited PCP today and was prescribed Cefpodoxime  - UA at [facility] positive for nitrates and leukocytes  - WBC 18.02 on admission  - Start IV Rocephin  - F/u UA, urine and blood cultures  - Tylenol 650 mg PO q6h PRN fever or mild pain  - Trend WBC and monitor for fever  - ID (Dr. Travis) consulted, f/u recs  - F/u urine and blood cultures Patient was dx with mild UTI at [facility] ED 4 days ago, not started on abx. She visited PCP today and was prescribed Cefpodoxime  - UA at [facility] positive for nitrates and leukocytes  - WBC 18.02 on admission  - Start IV Rocephin  - F/u UA, urine and blood cultures  - Trend WBC and monitor for fever  - ID (Dr. Travis) consulted, f/u recs  - F/u urine and blood cultures Patient was dx with mild UTI at [facility] ED 4 days ago, not started on abx. She visited PCP today and was prescribed Cefpodoxime  - UA at [facility] positive for nitrates and leukocytes  - WBC 18.02 on admission  - Start IV Rocephin  - F/u UA, urine and blood cultures  - Trend WBC and monitor for fever  - ID (Dr. Travis) consulted, f/u recs

## 2022-07-20 NOTE — H&P ADULT - NSHPREVIEWOFSYSTEMS_GEN_ALL_CORE
General: admits to decreased appetite 2/2 abd pain; no fever, chills  HEENT: no headache, dizziness  Cardio: no chest pain or discomfort  Pulm: no shortness of breath  GI: admits to nausea and vomiting x 2-3, admits to diffuse achy abd pain, admits to BRBPR  : denies dysuria, increased frequency, hematuria General: admits to decreased appetite 2/2 abd pain; no fever, chills  HEENT: no headache, dizziness  Cardio: no chest pain or discomfort  Pulm: no shortness of breath  GI: admits to nausea and vomiting x 2-3, admits to diffuse achy abd pain, admits to BRBPR  : denies dysuria, increased frequency, hematuria  NEUROLOGICAL: denies numbness, headache, focal weakness  MUSCULOSKELETAL: denies new joint pain, muscle aches  HEMATOLOGIC: denies gross bleeding, bruising  LYMPHATICS: denies enlarged lymph nodes, extremity swelling  PSYCHIATRIC: denies recent changes in anxiety, depression  ENDOCRINOLOGIC: denies sweating, cold or heat intolerance

## 2022-07-20 NOTE — ED PROVIDER NOTE - NSICDXPASTMEDICALHX_GEN_ALL_CORE_FT
PAST MEDICAL HISTORY:  CAD (coronary artery disease) 1 stent (2002) and PPM (2016)    GERD (gastroesophageal reflux disease)     HLD (hyperlipidemia)     HTN (hypertension)     Hypothyroid

## 2022-07-20 NOTE — H&P ADULT - PROBLEM SELECTOR PLAN 4
Patient hypotensive 90/43 -> 110/77 -> 114/55 on admission  - S/p 1L IV NS in the ED  - Hold home Losartan, Metoprolol, Amlodipine  - Monitor routine hemodynamics BASHIR likely pre-renal in the setting of dehydration, GIB, poor PO intake  - Cr 1.6 on admission, baseline Cr unknown  - IVF at 75cc/hr for 12 hours  - Monitor BMP  - Avoid nephrotoxic medications  - Monitor renal indices  - Consider nephrology consult for worsening renal function

## 2022-07-20 NOTE — CONSULT NOTE ADULT - ASSESSMENT
covid  rectal bleed    no further rectal bleed  regular diet  hgb appears stable  suspect hemorrhoidal bleed  outpatient follow up with her primary GI     I reviewed the overnight course of events on the unit, re-confirming the patient history. I discussed the care with the patient and their family  The plan of care was discussed with the physician assistant and modifications were made to the notation where appropriate.   Differential diagnosis and plan of care discussed with patient after the evaluation  35 minutes spent on total encounter of which more than fifty percent of the encounter was spent counseling and/or coordinating care by the attending physician.  Advanced care planning was discussed with patient and family.  Advanced care planning forms were reviewed and discussed.  Risks, benefits and alternatives of gastroenterologic procedures were discussed in detail and all questions were answered.  
88 year old female with PMHx of CAD s/p stents (2002), s/p PPM (2016), Hypertension, Hyperlipidemia, Hypothyroidism, hearing loss BIBA from home c/o BRBPR that began this evening. History is supplemented by patient's daughter (Magno) at bedside. Patient presented to [facility] ED 4 days ago c/o weakness and decreased PO intake after recent COVID dx July 7/14 after attending a gathering with people with COVID. She was diagnosed with a mild UTI at that time and was discharged from the ED s/p IVF.     RECOMMENDATIONS  1-COVID - day 6 of illness in high risk individual so will start REMDESIVIR, still in early viral nonhypoxic phase    First week COVID Rx options in order or recommended selection  1-Paxlovid BID x 5 days with 88-89% reduction in progression if given in first 3-5 days but adjust for renal function and check for med interactions  2-Remdesivir x 3 days with 87% reduction in progression if given in the first 7 days  3-Effective monoclonal Rx-currently Bebtolovimab in first 8 days  4-Molnupirivir w only 30% reduction in progression but no drug interactions or renal adjustments    2-UTI/Pyelonephritis - with leukocytosis, systemic symptoms, noted UA from 7/15 with mod LE, pos nitrates and pyuria, not seeing micro data so agree with ceftriaxone for now with recs to follow.    3-other issues coordinate with medicine    Thank you for consulting us and involving us in the management of this most interesting and challenging case.  We will follow along in the care of this patient. Please call us at [phone] or text me directly on my cell# at [phone] with any concerns.

## 2022-07-20 NOTE — CHART NOTE - NSCHARTNOTEFT_GEN_A_CORE
Patient seen and examined at bedside. Admitted overnight. Continue current plan. Discussed plan of care with daughters Magno and Zina and they are in agreement. Will discontinue Remdesivir as pt initially with COVID 7/7. BASHIR improved, continue IVF. Awaiting GI consult. Patient seen and examined at bedside. Admitted overnight. Continue current plan. Discussed plan of care with daughters Magno and Zina and they are in agreement. Will discontinue Remdesivir as pt initially with COVID 7/7. BASHIR improved, continue IVF. Awaiting GI consult.    Attempted to call Dr. Arroyo's office per pt request but no answer.

## 2022-07-20 NOTE — H&P ADULT - PROBLEM SELECTOR PLAN 3
BASHIR likely pre-renal in the setting of dehydration, GIB, poor PO intake  - Cr 1.6 on admission, baseline Cr unknown  - IVF at 75cc/hr for 12 hours  - Monitor BMP  - Avoid nephrotoxic medications  - Monitor renal indices  - Consider nephrology consult for worsening renal function Patient hypotensive 90/43 -> 110/77 -> 114/55 on admission  - S/p 1L IV NS in the ED  - Hold home Losartan, Metoprolol, Amlodipine  - Monitor routine hemodynamics

## 2022-07-20 NOTE — H&P ADULT - ATTENDING COMMENTS
88 year old female with PMHx of CAD s/p stents (2002), s/p PPM (2016), Hypertension, Hyperlipidemia, Hypothyroidism, hearing loss admitted for GIB.    Patient presents with abdominal pain, BRBPR, fatigue likely 2/2 GIB  - Hgb 11.8 on admission, wnl  - FOBT +  - CT a/p: Sigmoid diverticulosis without evidence of diverticulitis. Mild fluid distention of the colon which may be reflective of diarrhea. Moderate-sized sliding hiatus hernia.  - NPO for now  - Protonix 40 mg IVP BID  - Hold home ASA  - SCDs, hold pharmacologic DVT ppx  - Currently hemodynamically stable, monitor for signs and symptoms of active bleeding  - F/u vitamin B12, folate, iron panel: iron, ferritin, TIBC, transferrin  - GI (Dr. Morales) consulted, f/u recs

## 2022-07-20 NOTE — PATIENT PROFILE ADULT - FALL HARM RISK - RISK INTERVENTIONS

## 2022-07-21 ENCOUNTER — TRANSCRIPTION ENCOUNTER (OUTPATIENT)
Age: 87
End: 2022-07-21

## 2022-07-21 DIAGNOSIS — D72.829 ELEVATED WHITE BLOOD CELL COUNT, UNSPECIFIED: ICD-10-CM

## 2022-07-21 LAB
ALBUMIN SERPL ELPH-MCNC: 2.5 G/DL — LOW (ref 3.3–5)
ALP SERPL-CCNC: 48 U/L — SIGNIFICANT CHANGE UP (ref 40–120)
ALT FLD-CCNC: 13 U/L — SIGNIFICANT CHANGE UP (ref 12–78)
ANION GAP SERPL CALC-SCNC: 9 MMOL/L — SIGNIFICANT CHANGE UP (ref 5–17)
AST SERPL-CCNC: 25 U/L — SIGNIFICANT CHANGE UP (ref 15–37)
BASOPHILS # BLD AUTO: 0.02 K/UL — SIGNIFICANT CHANGE UP (ref 0–0.2)
BASOPHILS NFR BLD AUTO: 0.2 % — SIGNIFICANT CHANGE UP (ref 0–2)
BILIRUB DIRECT SERPL-MCNC: <0.1 MG/DL — SIGNIFICANT CHANGE UP (ref 0–0.3)
BILIRUB INDIRECT FLD-MCNC: >0.2 MG/DL — SIGNIFICANT CHANGE UP (ref 0.2–1)
BILIRUB SERPL-MCNC: 0.3 MG/DL — SIGNIFICANT CHANGE UP (ref 0.2–1.2)
BUN SERPL-MCNC: 12 MG/DL — SIGNIFICANT CHANGE UP (ref 7–23)
CALCIUM SERPL-MCNC: 7.6 MG/DL — LOW (ref 8.5–10.1)
CHLORIDE SERPL-SCNC: 115 MMOL/L — HIGH (ref 96–108)
CO2 SERPL-SCNC: 18 MMOL/L — LOW (ref 22–31)
CREAT SERPL-MCNC: 0.86 MG/DL — SIGNIFICANT CHANGE UP (ref 0.5–1.3)
EGFR: 65 ML/MIN/1.73M2 — SIGNIFICANT CHANGE UP
EOSINOPHIL # BLD AUTO: 0.07 K/UL — SIGNIFICANT CHANGE UP (ref 0–0.5)
EOSINOPHIL NFR BLD AUTO: 0.7 % — SIGNIFICANT CHANGE UP (ref 0–6)
FERRITIN SERPL-MCNC: 39 NG/ML — SIGNIFICANT CHANGE UP (ref 15–150)
FOLATE SERPL-MCNC: 5.8 NG/ML — SIGNIFICANT CHANGE UP
GLUCOSE SERPL-MCNC: 77 MG/DL — SIGNIFICANT CHANGE UP (ref 70–99)
HCT VFR BLD CALC: 29.6 % — LOW (ref 34.5–45)
HCT VFR BLD CALC: 35.1 % — SIGNIFICANT CHANGE UP (ref 34.5–45)
HGB BLD-MCNC: 11.3 G/DL — LOW (ref 11.5–15.5)
HGB BLD-MCNC: 9.4 G/DL — LOW (ref 11.5–15.5)
IMM GRANULOCYTES NFR BLD AUTO: 0.4 % — SIGNIFICANT CHANGE UP (ref 0–1.5)
INR BLD: 1.14 RATIO — SIGNIFICANT CHANGE UP (ref 0.88–1.16)
LYMPHOCYTES # BLD AUTO: 1.53 K/UL — SIGNIFICANT CHANGE UP (ref 1–3.3)
LYMPHOCYTES # BLD AUTO: 14.7 % — SIGNIFICANT CHANGE UP (ref 13–44)
MCHC RBC-ENTMCNC: 26.1 PG — LOW (ref 27–34)
MCHC RBC-ENTMCNC: 26.5 PG — LOW (ref 27–34)
MCHC RBC-ENTMCNC: 31.8 GM/DL — LOW (ref 32–36)
MCHC RBC-ENTMCNC: 32.2 GM/DL — SIGNIFICANT CHANGE UP (ref 32–36)
MCV RBC AUTO: 82.2 FL — SIGNIFICANT CHANGE UP (ref 80–100)
MCV RBC AUTO: 82.4 FL — SIGNIFICANT CHANGE UP (ref 80–100)
MONOCYTES # BLD AUTO: 0.98 K/UL — HIGH (ref 0–0.9)
MONOCYTES NFR BLD AUTO: 9.4 % — SIGNIFICANT CHANGE UP (ref 2–14)
NEUTROPHILS # BLD AUTO: 7.76 K/UL — HIGH (ref 1.8–7.4)
NEUTROPHILS NFR BLD AUTO: 74.6 % — SIGNIFICANT CHANGE UP (ref 43–77)
NRBC # BLD: 0 /100 WBCS — SIGNIFICANT CHANGE UP (ref 0–0)
NRBC # BLD: 0 /100 WBCS — SIGNIFICANT CHANGE UP (ref 0–0)
PLATELET # BLD AUTO: 204 K/UL — SIGNIFICANT CHANGE UP (ref 150–400)
PLATELET # BLD AUTO: 268 K/UL — SIGNIFICANT CHANGE UP (ref 150–400)
POTASSIUM SERPL-MCNC: 3.2 MMOL/L — LOW (ref 3.5–5.3)
POTASSIUM SERPL-SCNC: 3.2 MMOL/L — LOW (ref 3.5–5.3)
PROT SERPL-MCNC: 5 G/DL — LOW (ref 6–8.3)
PROTHROM AB SERPL-ACNC: 13.4 SEC — SIGNIFICANT CHANGE UP (ref 10.5–13.4)
RBC # BLD: 3.6 M/UL — LOW (ref 3.8–5.2)
RBC # BLD: 4.26 M/UL — SIGNIFICANT CHANGE UP (ref 3.8–5.2)
RBC # FLD: 15.3 % — HIGH (ref 10.3–14.5)
RBC # FLD: 15.5 % — HIGH (ref 10.3–14.5)
SODIUM SERPL-SCNC: 142 MMOL/L — SIGNIFICANT CHANGE UP (ref 135–145)
VIT B12 SERPL-MCNC: 319 PG/ML — SIGNIFICANT CHANGE UP (ref 232–1245)
WBC # BLD: 10.4 K/UL — SIGNIFICANT CHANGE UP (ref 3.8–10.5)
WBC # BLD: 18.87 K/UL — HIGH (ref 3.8–10.5)
WBC # FLD AUTO: 10.4 K/UL — SIGNIFICANT CHANGE UP (ref 3.8–10.5)
WBC # FLD AUTO: 18.87 K/UL — HIGH (ref 3.8–10.5)

## 2022-07-21 PROCEDURE — 99232 SBSQ HOSP IP/OBS MODERATE 35: CPT

## 2022-07-21 RX ORDER — SODIUM CHLORIDE 9 MG/ML
1000 INJECTION INTRAMUSCULAR; INTRAVENOUS; SUBCUTANEOUS
Refills: 0 | Status: DISCONTINUED | OUTPATIENT
Start: 2022-07-21 | End: 2022-07-21

## 2022-07-21 RX ORDER — SODIUM CHLORIDE 9 MG/ML
1000 INJECTION INTRAMUSCULAR; INTRAVENOUS; SUBCUTANEOUS
Refills: 0 | Status: DISCONTINUED | OUTPATIENT
Start: 2022-07-21 | End: 2022-07-23

## 2022-07-21 RX ORDER — POTASSIUM CHLORIDE 20 MEQ
40 PACKET (EA) ORAL EVERY 4 HOURS
Refills: 0 | Status: COMPLETED | OUTPATIENT
Start: 2022-07-21 | End: 2022-07-21

## 2022-07-21 RX ORDER — ACETAMINOPHEN 500 MG
650 TABLET ORAL ONCE
Refills: 0 | Status: COMPLETED | OUTPATIENT
Start: 2022-07-21 | End: 2022-07-21

## 2022-07-21 RX ADMIN — Medication 50 MICROGRAM(S): at 05:44

## 2022-07-21 RX ADMIN — Medication 650 MILLIGRAM(S): at 01:15

## 2022-07-21 RX ADMIN — PANTOPRAZOLE SODIUM 40 MILLIGRAM(S): 20 TABLET, DELAYED RELEASE ORAL at 17:52

## 2022-07-21 RX ADMIN — Medication 40 MILLIEQUIVALENT(S): at 11:14

## 2022-07-21 RX ADMIN — Medication 40 MILLIEQUIVALENT(S): at 14:42

## 2022-07-21 RX ADMIN — CEFTRIAXONE 100 MILLIGRAM(S): 500 INJECTION, POWDER, FOR SOLUTION INTRAMUSCULAR; INTRAVENOUS at 05:47

## 2022-07-21 RX ADMIN — SODIUM CHLORIDE 75 MILLILITER(S): 9 INJECTION INTRAMUSCULAR; INTRAVENOUS; SUBCUTANEOUS at 20:00

## 2022-07-21 RX ADMIN — Medication 650 MILLIGRAM(S): at 00:44

## 2022-07-21 RX ADMIN — ATORVASTATIN CALCIUM 10 MILLIGRAM(S): 80 TABLET, FILM COATED ORAL at 22:15

## 2022-07-21 RX ADMIN — PANTOPRAZOLE SODIUM 40 MILLIGRAM(S): 20 TABLET, DELAYED RELEASE ORAL at 05:46

## 2022-07-21 RX ADMIN — Medication 75 MICROGRAM(S): at 05:45

## 2022-07-21 NOTE — PROGRESS NOTE ADULT - ASSESSMENT
88 year old female with PMHx of CAD s/p stents (2002), s/p PPM (2016), Hypertension, Hyperlipidemia, Hypothyroidism, hearing loss BIBA from home c/o BRBPR that began this evening. History is supplemented by patient's daughter (Magno) at bedside. Patient presented to [facility] ED 4 days ago c/o weakness and decreased PO intake after recent COVID dx early July after attending a gathering with people with COVID. She was diagnosed with a mild UTI at that time and was discharged from the ED s/p IVF.     RECOMMENDATIONS  1-COVID - clarified from daughter and on review of [facility] hospital visit looks like positive COVID dated as 7/7 and treated with Paxlovid so not in first week of illness so past acute phase and would agree with no antiviral at this point-supportive care only and as past second week very low risk of decompensation if history accurate also not clear that isolation required    2-UTI/Pyelonephritis - with leukocytosis, systemic symptoms, urine culture from [facility] collection w Proteus Mirabilis relatively pansensitive only resistant to macrobid, noted UA from 7/15 with mod LE, pos nitrates and pyuria, not seeing micro data so agree with ceftriaxone for now   pt interested in dc to home so if fine for other issues, nl wbc, afebrile and improved so fine to look at dc on    Keflex 500mg PO QID with last day 7/29    3-other issues coordinate with medicine    Thank you for consulting us and involving us in the management of this most interesting and challenging case.  We will follow along in the care of this patient. Please call us at [phone] or text me directly on my cell# at [phone] with any concerns.

## 2022-07-21 NOTE — PROGRESS NOTE ADULT - SUBJECTIVE AND OBJECTIVE BOX
[facility] DIVISION of INFECTIOUS DISEASE  Eugenio Willis MD PhD, Mary Jane Haro MD, Gretchen Eckert MD, Maicol Bowman MD, Ran Adrian MD  and providing coverage with Mercedez Villaseñor MD  Providing Infectious Disease Consultations at [facility], [facility], [place], [facility], [facility]'s    Office# [phone] to schedule follow up appointments  Answering Service for urgent calls or New Consults [phone]  Cell# to text for urgent issues Eugenio Willis [phone]     infectious diseases progress note:    MATI GUNN is a 88y y. o. Female patient    Overnight and events of the last 24hrs reviewed    Allergies    No Known Allergies    Intolerances        ANTIBIOTICS/RELEVANT:  antimicrobials  cefTRIAXone   IVPB 1000 milliGRAM(s) IV Intermittent every 24 hours    immunologic:    OTHER:  atorvastatin 10 milliGRAM(s) Oral at bedtime  levothyroxine 50 MICROGram(s) Oral <User Schedule>  levothyroxine 75 MICROGram(s) Oral <User Schedule>  loperamide 2 milliGRAM(s) Oral once  pantoprazole  Injectable 40 milliGRAM(s) IV Push two times a day  potassium chloride    Tablet ER 40 milliEquivalent(s) Oral every 4 hours  sodium chloride 0.9%. 1000 milliLiter(s) IV Continuous <Continuous>      Objective:  Vital Signs Last 24 Hrs  T(C): 37.3 (21 Jul 2022 13:06), Max: 37.3 (21 Jul 2022 13:06)  T(F): 99.1 (21 Jul 2022 13:06), Max: 99.1 (21 Jul 2022 13:06)  HR: 92 (21 Jul 2022 13:06) (79 - 92)  BP: 112/64 (21 Jul 2022 13:06) (112/64 - 148/73)  BP(mean): --  RR: 18 (21 Jul 2022 13:06) (16 - 18)  SpO2: 98% (21 Jul 2022 13:06) (93% - 98%)    Parameters below as of 21 Jul 2022 13:06  Patient On (Oxygen Delivery Method): room air        T(C): 37.3 (07-21-22 @ 13:06), Max: 37.3 (07-21-22 @ 13:06)  T(C): 37.3 (07-21-22 @ 13:06), Max: 37.3 (07-21-22 @ 13:06)  T(C): 37.3 (07-21-22 @ 13:06), Max: 37.3 (07-21-22 @ 13:06)    PHYSICAL EXAM:  HEENT: NC atraumatic  Neck: supple  Respiratory: no accessory muscle use, breathing comfortably  Cardiovascular: distant  Gastrointestinal: normal appearing, nondistended  Extremities: no clubbing, no cyanosis,        LABS:                          9.4    10.40 )-----------( 204      ( 21 Jul 2022 07:40 )             29.6       WBC  10.40 07-21 @ 07:40  14.71 07-20 @ 03:10  18.02 07-19 @ 23:46      07-21    142  |  115<H>  |  12  ----------------------------<  77  3.2<L>   |  18<L>  |  0.86    Ca    7.6<L>      21 Jul 2022 07:40    TPro  5.0<L>  /  Alb  x   /  TBili  0.3  /  DBili  <0.1  /  AST  25  /  ALT  13  /  AlkPhos  48  07-21      Creatinine, Serum: 0.86 mg/dL (07-21-22 @ 07:40)  Creatinine, Serum: 1.00 mg/dL (07-20-22 @ 14:30)  Creatinine, Serum: 1.20 mg/dL (07-20-22 @ 03:10)  Creatinine, Serum: 1.60 mg/dL (07-19-22 @ 23:46)      PT/INR - ( 21 Jul 2022 07:40 )   PT: 13.4 sec;   INR: 1.14 ratio         PTT - ( 19 Jul 2022 23:46 )  PTT:25.2 sec          INFLAMMATORY MARKERS  Auto Neutrophil #: 7.76 K/uL (07-21-22 @ 07:40)  Auto Lymphocyte #: 1.53 K/uL (07-21-22 @ 07:40)  Auto Neutrophil #: 13.31 K/uL (07-20-22 @ 03:10)  Auto Lymphocyte #: 0.23 K/uL (07-20-22 @ 03:10)  Auto Neutrophil #: 15.79 K/uL (07-19-22 @ 23:46)  Auto Lymphocyte #: 0.52 K/uL (07-19-22 @ 23:46)      Auto Eosinophil #: 0.07 K/uL (07-21-22 @ 07:40)  Auto Eosinophil #: 0.02 K/uL (07-20-22 @ 03:10)  Auto Eosinophil #: 0.05 K/uL (07-19-22 @ 23:46)                  Ferritin, Serum: 39 ng/mL (07-20-22 @ 03:10)        INR: 1.14 ratio (07-21-22 @ 07:40)  INR: 1.14 ratio (07-20-22 @ 13:54)  INR: 1.06 ratio (07-19-22 @ 23:46)  Activated Partial Thromboplastin Time: 25.2 sec (07-19-22 @ 23:46)    eGFR: 65. mL/min/1.73m2 (07.21.22 @ 07:40)        MICROBIOLOGY:          RADIOLOGY & ADDITIONAL STUDIES:

## 2022-07-21 NOTE — DISCHARGE NOTE NURSING/CASE MANAGEMENT/SOCIAL WORK - NSSCNAMETXT_GEN_ALL_CORE
[facility] - Rehab at Home - [phone]  [facility] Physical, Occupational & Aquatic Therapy - [phone]

## 2022-07-21 NOTE — PROGRESS NOTE ADULT - ASSESSMENT
covid  rectal bleed    no further rectal bleed  regular diet  hgb noted, f/u repeat  suspect resolving hemorrhoidal bleed  outpatient follow up with her primary GI     I reviewed the overnight course of events on the unit, re-confirming the patient history. I discussed the care with the patient and their family  The plan of care was discussed with the physician assistant and modifications were made to the notation where appropriate.   Differential diagnosis and plan of care discussed with patient after the evaluation  35 minutes spent on total encounter of which more than fifty percent of the encounter was spent counseling and/or coordinating care by the attending physician.  Advanced care planning was discussed with patient and family.  Advanced care planning forms were reviewed and discussed.  Risks, benefits and alternatives of gastroenterologic procedures were discussed in detail and all questions were answered.

## 2022-07-21 NOTE — DISCHARGE NOTE NURSING/CASE MANAGEMENT/SOCIAL WORK - NSSCTYPOFSERV_GEN_ALL_CORE
Please contact either of these agencies to coordinate home physical therapy. A prescription was given to you at your bedside prior to discharge.

## 2022-07-21 NOTE — PROGRESS NOTE ADULT - SUBJECTIVE AND OBJECTIVE BOX
Patient is a 88y old  Female who presents with a chief complaint of GIB (21 Jul 2022 13:15)      INTERVAL HPI/OVERNIGHT EVENTS: Patient seen and examined at bedside. No overnight events occurred. Patient has no complaints at this time. No more episodes of bleeding or diarrhea. Abd pain improved. Denies fevers, chills, headache, lightheadedness, chest pain, dyspnea, abdominal pain, n/v/d/c.    MEDICATIONS  (STANDING):  atorvastatin 10 milliGRAM(s) Oral at bedtime  cefTRIAXone   IVPB 1000 milliGRAM(s) IV Intermittent every 24 hours  levothyroxine 50 MICROGram(s) Oral <User Schedule>  levothyroxine 75 MICROGram(s) Oral <User Schedule>  loperamide 2 milliGRAM(s) Oral once  pantoprazole  Injectable 40 milliGRAM(s) IV Push two times a day  sodium chloride 0.9%. 1000 milliLiter(s) (75 mL/Hr) IV Continuous <Continuous>    MEDICATIONS  (PRN):      Allergies    No Known Allergies    Intolerances        REVIEW OF SYSTEMS:  CONSTITUTIONAL: No fever or chills  HEENT:  No headache, no sore throat  RESPIRATORY: No cough, wheezing, or shortness of breath  CARDIOVASCULAR: No chest pain, palpitations  GASTROINTESTINAL: No abd pain, nausea, vomiting, or diarrhea  GENITOURINARY: No dysuria, frequency, or hematuria  NEUROLOGICAL: no focal weakness or dizziness  MUSCULOSKELETAL: no myalgias     Vital Signs Last 24 Hrs  T(C): 37.3 (21 Jul 2022 13:06), Max: 37.3 (21 Jul 2022 13:06)  T(F): 99.1 (21 Jul 2022 13:06), Max: 99.1 (21 Jul 2022 13:06)  HR: 92 (21 Jul 2022 13:06) (79 - 92)  BP: 112/64 (21 Jul 2022 13:06) (112/64 - 148/73)  BP(mean): --  RR: 18 (21 Jul 2022 13:06) (16 - 18)  SpO2: 98% (21 Jul 2022 13:06) (93% - 98%)    Parameters below as of 21 Jul 2022 13:06  Patient On (Oxygen Delivery Method): room air        PHYSICAL EXAM:  GENERAL: NAD  HEENT:  anicteric, moist mucous membranes  CHEST/LUNG:  CTA b/l, no rales, wheezes, or rhonchi  HEART:  RRR, S1, S2  ABDOMEN:  BS+, soft, improved abd pain, mildly generalized tenderness to deep palpation, nondistended  EXTREMITIES: no edema, cyanosis, or calf tenderness  NERVOUS SYSTEM: answers questions and follows commands appropriately    LABS:                        11.3   18.87 )-----------( 268      ( 21 Jul 2022 15:20 )             35.1     CBC Full  -  ( 21 Jul 2022 15:20 )  WBC Count : 18.87 K/uL  Hemoglobin : 11.3 g/dL  Hematocrit : 35.1 %  Platelet Count - Automated : 268 K/uL  Mean Cell Volume : 82.4 fl  Mean Cell Hemoglobin : 26.5 pg  Mean Cell Hemoglobin Concentration : 32.2 gm/dL  Auto Neutrophil # : x  Auto Lymphocyte # : x  Auto Monocyte # : x  Auto Eosinophil # : x  Auto Basophil # : x  Auto Neutrophil % : x  Auto Lymphocyte % : x  Auto Monocyte % : x  Auto Eosinophil % : x  Auto Basophil % : x    21 Jul 2022 07:40    142    |  115    |  12     ----------------------------<  77     3.2     |  18     |  0.86     Ca    7.6        21 Jul 2022 07:40    TPro  5.0    /  Alb  2.5    /  TBili  0.3    /  DBili  <0.1   /  AST  25     /  ALT  13     /  AlkPhos  48     21 Jul 2022 07:40    PT/INR - ( 21 Jul 2022 07:40 )   PT: 13.4 sec;   INR: 1.14 ratio         PTT - ( 19 Jul 2022 23:46 )  PTT:25.2 sec    CAPILLARY BLOOD GLUCOSE            Culture - Blood (collected 07-20-22 @ 05:10)  Source: .Blood Blood-Peripheral  Preliminary Report (07-21-22 @ 10:01):    No growth to date.    Culture - Blood (collected 07-20-22 @ 05:10)  Source: .Blood Blood-Peripheral  Preliminary Report (07-21-22 @ 10:01):    No growth to date.        RADIOLOGY & ADDITIONAL TESTS:  None in past 24 hours  Personally reviewed.     Consultant(s) Notes Reviewed:  [x] YES  [ ] NO

## 2022-07-21 NOTE — PROGRESS NOTE ADULT - SUBJECTIVE AND OBJECTIVE BOX
[facility]  Chandrakant Brown PA-C  [address] [place] [address]  [phone]      INTERVAL HPI/OVERNIGHT EVENTS:  Pt s/e  Pt reports no further rectal bleeding  No BM since admission  Denies further GI complaints  Tolerating diet    MEDICATIONS  (STANDING):  atorvastatin 10 milliGRAM(s) Oral at bedtime  cefTRIAXone   IVPB 1000 milliGRAM(s) IV Intermittent every 24 hours  levothyroxine 50 MICROGram(s) Oral <User Schedule>  levothyroxine 75 MICROGram(s) Oral <User Schedule>  loperamide 2 milliGRAM(s) Oral once  pantoprazole  Injectable 40 milliGRAM(s) IV Push two times a day  potassium chloride    Tablet ER 40 milliEquivalent(s) Oral every 4 hours  sodium chloride 0.9%. 1000 milliLiter(s) (75 mL/Hr) IV Continuous <Continuous>    MEDICATIONS  (PRN):      Allergies  No Known Allergies      PHYSICAL EXAM:   Vital Signs:  Vital Signs Last 24 Hrs  T(C): 36.7 (2022 05:46), Max: 36.9 (2022 18:04)  T(F): 98 (2022 05:46), Max: 98.5 (2022 22:16)  HR: 79 (2022 05:46) (79 - 85)  BP: 148/73 (2022 05:46) (119/66 - 148/73)  BP(mean): --  RR: 18 (2022 05:46) (16 - 18)  SpO2: 93% (2022 05:46) (93% - 98%)    Parameters below as of 2022 05:46  Patient On (Oxygen Delivery Method): room air      Daily     Daily Weight in k.8 (2022 05:46)    GENERAL:  Appears stated age  ABDOMEN:  Soft, non-tender, non-distended  NEURO:  Alert    LABS:                        9.4    10.40 )-----------( 204      ( 2022 07:40 )             29.6     07-21    142  |  115<H>  |  12  ----------------------------<  77  3.2<L>   |  18<L>  |  0.86    Ca    7.6<L>      2022 07:40    TPro  5.0<L>  /  Alb  x   /  TBili  0.3  /  DBili  <0.1  /  AST  25  /  ALT  13  /  AlkPhos  48  07-21    PT/INR - ( 2022 07:40 )   PT: 13.4 sec;   INR: 1.14 ratio         PTT - ( 2022 23:46 )  PTT:25.2 sec

## 2022-07-21 NOTE — CHART NOTE - NSCHARTNOTEFT_GEN_A_CORE
RN called to notify patient with some redness around IV site. On eval patient c/o RN called to notify patient with some redness around IV site. On eval patient c/o pain and redness in her L hand distal to IV site. VS wnl. On examination dorsolateral L hand and wrist red, swollen and tender to palpation. Patient has a functioning IV in her R arm as well so will pull IV from L hand. Patient is currently on IV Rocephin, if clinically worsens will add vanc. LUE ultrasound ordered.

## 2022-07-21 NOTE — DISCHARGE NOTE NURSING/CASE MANAGEMENT/SOCIAL WORK - NSDCPEFALRISK_GEN_ALL_CORE
For information on Fall & Injury Prevention, visit: [email or web address] OR  [email or web address] OR  https://www.cdc.gov/steadi/patient.html

## 2022-07-21 NOTE — DISCHARGE NOTE NURSING/CASE MANAGEMENT/SOCIAL WORK - PATIENT PORTAL LINK FT
You can access the FollowMyHealth Patient Portal offered by [facility] by registering at the following website: [email or web address]. By joining [facility]’s FollowMyHealth portal, you will also be able to view your health information using other applications (apps) compatible with our system.

## 2022-07-22 DIAGNOSIS — I80.9 PHLEBITIS AND THROMBOPHLEBITIS OF UNSPECIFIED SITE: ICD-10-CM

## 2022-07-22 DIAGNOSIS — M25.562 PAIN IN LEFT KNEE: ICD-10-CM

## 2022-07-22 LAB
ALBUMIN SERPL ELPH-MCNC: 2.6 G/DL — LOW (ref 3.3–5)
ALP SERPL-CCNC: 53 U/L — SIGNIFICANT CHANGE UP (ref 40–120)
ALT FLD-CCNC: 12 U/L — SIGNIFICANT CHANGE UP (ref 12–78)
ANION GAP SERPL CALC-SCNC: 10 MMOL/L — SIGNIFICANT CHANGE UP (ref 5–17)
AST SERPL-CCNC: 20 U/L — SIGNIFICANT CHANGE UP (ref 15–37)
BASOPHILS # BLD AUTO: 0.02 K/UL — SIGNIFICANT CHANGE UP (ref 0–0.2)
BASOPHILS NFR BLD AUTO: 0.1 % — SIGNIFICANT CHANGE UP (ref 0–2)
BILIRUB DIRECT SERPL-MCNC: 0.2 MG/DL — SIGNIFICANT CHANGE UP (ref 0–0.3)
BILIRUB INDIRECT FLD-MCNC: 0.4 MG/DL — SIGNIFICANT CHANGE UP (ref 0.2–1)
BILIRUB SERPL-MCNC: 0.6 MG/DL — SIGNIFICANT CHANGE UP (ref 0.2–1.2)
BUN SERPL-MCNC: 10 MG/DL — SIGNIFICANT CHANGE UP (ref 7–23)
CALCIUM SERPL-MCNC: 8.3 MG/DL — LOW (ref 8.5–10.1)
CHLORIDE SERPL-SCNC: 112 MMOL/L — HIGH (ref 96–108)
CO2 SERPL-SCNC: 17 MMOL/L — LOW (ref 22–31)
CREAT SERPL-MCNC: 0.73 MG/DL — SIGNIFICANT CHANGE UP (ref 0.5–1.3)
CRP SERPL-MCNC: 116 MG/L — HIGH
CULTURE RESULTS: SIGNIFICANT CHANGE UP
EGFR: 79 ML/MIN/1.73M2 — SIGNIFICANT CHANGE UP
EOSINOPHIL # BLD AUTO: 0.01 K/UL — SIGNIFICANT CHANGE UP (ref 0–0.5)
EOSINOPHIL NFR BLD AUTO: 0.1 % — SIGNIFICANT CHANGE UP (ref 0–6)
ERYTHROCYTE [SEDIMENTATION RATE] IN BLOOD: 13 MM/HR — SIGNIFICANT CHANGE UP (ref 0–20)
GLUCOSE SERPL-MCNC: 85 MG/DL — SIGNIFICANT CHANGE UP (ref 70–99)
HCT VFR BLD CALC: 31.1 % — LOW (ref 34.5–45)
HGB BLD-MCNC: 9.9 G/DL — LOW (ref 11.5–15.5)
IMM GRANULOCYTES NFR BLD AUTO: 0.5 % — SIGNIFICANT CHANGE UP (ref 0–1.5)
INR BLD: 1.17 RATIO — HIGH (ref 0.88–1.16)
LYMPHOCYTES # BLD AUTO: 0.94 K/UL — LOW (ref 1–3.3)
LYMPHOCYTES # BLD AUTO: 6.7 % — LOW (ref 13–44)
MCHC RBC-ENTMCNC: 25.8 PG — LOW (ref 27–34)
MCHC RBC-ENTMCNC: 31.8 GM/DL — LOW (ref 32–36)
MCV RBC AUTO: 81.2 FL — SIGNIFICANT CHANGE UP (ref 80–100)
MONOCYTES # BLD AUTO: 1.22 K/UL — HIGH (ref 0–0.9)
MONOCYTES NFR BLD AUTO: 8.7 % — SIGNIFICANT CHANGE UP (ref 2–14)
NEUTROPHILS # BLD AUTO: 11.73 K/UL — HIGH (ref 1.8–7.4)
NEUTROPHILS NFR BLD AUTO: 83.9 % — HIGH (ref 43–77)
NRBC # BLD: 0 /100 WBCS — SIGNIFICANT CHANGE UP (ref 0–0)
PLATELET # BLD AUTO: 213 K/UL — SIGNIFICANT CHANGE UP (ref 150–400)
POTASSIUM SERPL-MCNC: 4.2 MMOL/L — SIGNIFICANT CHANGE UP (ref 3.5–5.3)
POTASSIUM SERPL-SCNC: 4.2 MMOL/L — SIGNIFICANT CHANGE UP (ref 3.5–5.3)
PROT SERPL-MCNC: 5.5 G/DL — LOW (ref 6–8.3)
PROTHROM AB SERPL-ACNC: 13.7 SEC — HIGH (ref 10.5–13.4)
RBC # BLD: 3.83 M/UL — SIGNIFICANT CHANGE UP (ref 3.8–5.2)
RBC # FLD: 15.3 % — HIGH (ref 10.3–14.5)
SODIUM SERPL-SCNC: 139 MMOL/L — SIGNIFICANT CHANGE UP (ref 135–145)
SPECIMEN SOURCE: SIGNIFICANT CHANGE UP
WBC # BLD: 13.99 K/UL — HIGH (ref 3.8–10.5)
WBC # FLD AUTO: 13.99 K/UL — HIGH (ref 3.8–10.5)

## 2022-07-22 PROCEDURE — 93971 EXTREMITY STUDY: CPT | Mod: 26,59,LT

## 2022-07-22 PROCEDURE — 99232 SBSQ HOSP IP/OBS MODERATE 35: CPT

## 2022-07-22 PROCEDURE — 73562 X-RAY EXAM OF KNEE 3: CPT | Mod: 26,LT

## 2022-07-22 RX ORDER — ACETAMINOPHEN 500 MG
650 TABLET ORAL EVERY 6 HOURS
Refills: 0 | Status: DISCONTINUED | OUTPATIENT
Start: 2022-07-22 | End: 2022-07-23

## 2022-07-22 RX ORDER — TRIAMCINOLONE 4 MG
40 TABLET ORAL ONCE
Refills: 0 | Status: DISCONTINUED | OUTPATIENT
Start: 2022-07-22 | End: 2022-07-22

## 2022-07-22 RX ORDER — PANTOPRAZOLE SODIUM 20 MG/1
40 TABLET, DELAYED RELEASE ORAL
Refills: 0 | Status: DISCONTINUED | OUTPATIENT
Start: 2022-07-22 | End: 2022-07-23

## 2022-07-22 RX ORDER — CEFTRIAXONE 500 MG/1
1000 INJECTION, POWDER, FOR SOLUTION INTRAMUSCULAR; INTRAVENOUS EVERY 24 HOURS
Refills: 0 | Status: DISCONTINUED | OUTPATIENT
Start: 2022-07-22 | End: 2022-07-23

## 2022-07-22 RX ORDER — BUPIVACAINE HCL/PF 7.5 MG/ML
3 VIAL (ML) INJECTION ONCE
Refills: 0 | Status: DISCONTINUED | OUTPATIENT
Start: 2022-07-22 | End: 2022-07-22

## 2022-07-22 RX ORDER — LANOLIN ALCOHOL/MO/W.PET/CERES
3 CREAM (GRAM) TOPICAL AT BEDTIME
Refills: 0 | Status: DISCONTINUED | OUTPATIENT
Start: 2022-07-22 | End: 2022-07-23

## 2022-07-22 RX ORDER — FERROUS SULFATE 325(65) MG
325 TABLET ORAL DAILY
Refills: 0 | Status: DISCONTINUED | OUTPATIENT
Start: 2022-07-22 | End: 2022-07-23

## 2022-07-22 RX ADMIN — Medication 650 MILLIGRAM(S): at 08:45

## 2022-07-22 RX ADMIN — Medication 650 MILLIGRAM(S): at 19:54

## 2022-07-22 RX ADMIN — Medication 3 MILLIGRAM(S): at 23:55

## 2022-07-22 RX ADMIN — PANTOPRAZOLE SODIUM 40 MILLIGRAM(S): 20 TABLET, DELAYED RELEASE ORAL at 05:52

## 2022-07-22 RX ADMIN — CEFTRIAXONE 100 MILLIGRAM(S): 500 INJECTION, POWDER, FOR SOLUTION INTRAMUSCULAR; INTRAVENOUS at 05:51

## 2022-07-22 RX ADMIN — Medication 650 MILLIGRAM(S): at 18:48

## 2022-07-22 RX ADMIN — Medication 40 MILLIGRAM(S): at 11:16

## 2022-07-22 RX ADMIN — ATORVASTATIN CALCIUM 10 MILLIGRAM(S): 80 TABLET, FILM COATED ORAL at 21:38

## 2022-07-22 NOTE — PROGRESS NOTE ADULT - SUBJECTIVE AND OBJECTIVE BOX
[facility]  Chandrakant Brown PA-C  [address] [place] [address]  [phone]      INTERVAL HPI/OVERNIGHT EVENTS:  Pt s/e  Reports no further overt GI bleeding symptoms  Tolerating diet  No further Gi complaints    MEDICATIONS  (STANDING):  atorvastatin 10 milliGRAM(s) Oral at bedtime  cefTRIAXone   IVPB 1000 milliGRAM(s) IV Intermittent every 24 hours  levothyroxine 50 MICROGram(s) Oral <User Schedule>  levothyroxine 75 MICROGram(s) Oral <User Schedule>  loperamide 2 milliGRAM(s) Oral once  pantoprazole  Injectable 40 milliGRAM(s) IV Push two times a day  sodium chloride 0.9%. 1000 milliLiter(s) (75 mL/Hr) IV Continuous <Continuous>    MEDICATIONS  (PRN):  acetaminophen     Tablet .. 650 milliGRAM(s) Oral every 6 hours PRN Mild Pain (1 - 3)      Allergies  No Known Allergies      PHYSICAL EXAM:   Vital Signs:  Vital Signs Last 24 Hrs  T(C): 37.3 (2022 04:35), Max: 37.3 (2022 13:06)  T(F): 99.1 (2022 04:35), Max: 99.1 (2022 13:06)  HR: 93 (2022 04:35) (81 - 93)  BP: 134/60 (2022 04:35) (112/64 - 134/60)  BP(mean): --  RR: 18 (2022 04:35) (18 - 18)  SpO2: 95% (2022 04:35) (95% - 98%)    Parameters below as of 2022 04:35  Patient On (Oxygen Delivery Method): room air      Daily     Daily Weight in k.8 (2022 04:35)    GENERAL:  Appears stated age  ABDOMEN:  Soft, non-tender, non-distended  NEURO:  Alert    LABS:                        9.9    13.99 )-----------( 213      ( 2022 07:45 )             31.1     07-22    139  |  112<H>  |  10  ----------------------------<  85  4.2   |  17<L>  |  0.73    Ca    8.3<L>      2022 07:45    TPro  5.5<L>  /  Alb  2.6<L>  /  TBili  0.6  /  DBili  0.2  /  AST  20  /  ALT  12  /  AlkPhos  53  07-22    PT/INR - ( 2022 07:45 )   PT: 13.7 sec;   INR: 1.17 ratio

## 2022-07-22 NOTE — PROGRESS NOTE ADULT - PROBLEM SELECTOR PLAN 6
Patient is s/p stents (2002) and s/p PPM (2016)  - Hold home ASA  - statin resumed
BP remains soft off of antihypertensives  - S/p 1L IV NS in the ED  - Hold home Losartan, Metoprolol, Amlodipine  - Monitor routine hemodynamics

## 2022-07-22 NOTE — PROGRESS NOTE ADULT - PROBLEM SELECTOR PLAN 2
initially improved but now worsened back to 18k, unclear etiology as pt clinically appears to have improved  -monitor for one more day  -f/u AM CBC with diff  -ID following
acute left knee pain, differential includes arthritis flare, pseudogout, bursitis  -x-ray showing effusion, baker's cyst  -ortho consulted, doubt septic joint, recommending IV steroids, given 1 dose of IV solu-medrol 40 mg  -pt re-evaluated after steroids by PT, unable to ambulate well, not safe to go home today  -will monitor one more day and give additional dose of IV solu-medrol tomorrow morning  -lidocaine patch, tylenol PRN

## 2022-07-22 NOTE — PROGRESS NOTE ADULT - ASSESSMENT
88 year old female with PMHx of CAD s/p stents (2002), s/p PPM (2016), Hypertension, Hyperlipidemia, Hypothyroidism, hearing loss BIBA from home c/o BRBPR that began this evening. History is supplemented by patient's daughter (Magno) at bedside. Patient presented to [facility] ED 4 days ago c/o weakness and decreased PO intake after recent COVID dx early July after attending a gathering with people with COVID. She was diagnosed with a mild UTI at that time and was discharged from the ED s/p IVF.     RECOMMENDATIONS  1-COVID - clarified from daughter and on review of [facility] hospital visit looks like positive COVID dated as 7/7 and treated with Paxlovid so not in first week of illness so past acute phase and would agree with no antiviral at this point-supportive care only and as past second week very low risk of decompensation   based on history no isolation required    2-UTI/Pyelonephritis - with leukocytosis, systemic symptoms, urine culture from [facility] collection w Proteus Mirabilis relatively pansensitive only resistant to macrobid, noted UA from 7/15 with mod LE, pos nitrates and pyuria, not seeing micro data so agree with ceftriaxone for now   pt interested in dc to home so if fine for other issues, nl wbc, afebrile and improved so fine to look at dc on    Keflex 500mg PO QID with last day 7/29    3-knee issue - noted swelling in prepatellar region and not concerning for DVT, defer to medicine, not seemingly infectious or COVID related suggest this may be noninfectious prepatellar bursitis    From an ID standpoint no further requirement for inpatient status for the management of ID issues. Fine with discharge from ID standpoint when other medical issues no longer require inpatient care and social issues allow for a safe discharge plan. To schedule an outpatient ID follow up appointment please call our office at [phone]    Thank you for consulting us and involving us in the management of this most interesting and challenging case.  We will follow along in the care of this patient. Please call us at [phone] or text me directly on my cell# at [phone] with any concerns.    Over the weekend Dr Gretchen Eckert will be covering this patient so please contact her with any questions, concerns or new micro data.

## 2022-07-22 NOTE — PROGRESS NOTE ADULT - PROBLEM SELECTOR PLAN 7
levothyroxine resumed
BASHIR likely pre-renal in the setting of dehydration, GIB, poor PO intake, RESOLVED  - Cr 1.6 on admission, baseline Cr unknown  - Monitor BMP  - Avoid nephrotoxic medications  - Monitor renal indices

## 2022-07-22 NOTE — PROGRESS NOTE ADULT - SUBJECTIVE AND OBJECTIVE BOX
Patient is a 88y old  Female who presents with a chief complaint of GIB (22 Jul 2022 12:11)      INTERVAL HPI/OVERNIGHT EVENTS: Patient seen and examined at bedside. No overnight events occurred. Patient complaining of severe L knee pain, unable to flex L knee. Cannot recall any trauma to the area. Also having L hand redness and swelling since last night that has slightly worsened. Dopplers performed and were negative. Ortho consulted recommending steroids so patient was given IV solu-medrol. No more diarrhea or bleeding.    MEDICATIONS  (STANDING):  atorvastatin 10 milliGRAM(s) Oral at bedtime  cefTRIAXone   IVPB 1000 milliGRAM(s) IV Intermittent every 24 hours  levothyroxine 50 MICROGram(s) Oral <User Schedule>  levothyroxine 75 MICROGram(s) Oral <User Schedule>  loperamide 2 milliGRAM(s) Oral once  pantoprazole  Injectable 40 milliGRAM(s) IV Push two times a day  sodium chloride 0.9%. 1000 milliLiter(s) (75 mL/Hr) IV Continuous <Continuous>    MEDICATIONS  (PRN):  acetaminophen     Tablet .. 650 milliGRAM(s) Oral every 6 hours PRN Mild Pain (1 - 3)      Allergies    No Known Allergies    Intolerances        REVIEW OF SYSTEMS:  CONSTITUTIONAL: No fever or chills  HEENT:  No headache, no sore throat  RESPIRATORY: No cough, wheezing, or shortness of breath  CARDIOVASCULAR: No chest pain, palpitations  GASTROINTESTINAL: No abd pain, nausea, vomiting, or diarrhea  GENITOURINARY: No dysuria, frequency, or hematuria  NEUROLOGICAL: no focal weakness or dizziness  MUSCULOSKELETAL: L knee pain, L hand pain    Vital Signs Last 24 Hrs  T(C): 36.7 (22 Jul 2022 12:46), Max: 37.3 (22 Jul 2022 04:35)  T(F): 98 (22 Jul 2022 12:46), Max: 99.1 (22 Jul 2022 04:35)  HR: 85 (22 Jul 2022 12:46) (81 - 93)  BP: 130/61 (22 Jul 2022 12:46) (121/65 - 134/60)  BP(mean): --  RR: 16 (22 Jul 2022 12:46) (16 - 18)  SpO2: 95% (22 Jul 2022 12:46) (95% - 98%)    Parameters below as of 22 Jul 2022 12:46  Patient On (Oxygen Delivery Method): room air        PHYSICAL EXAM:  GENERAL: NAD  HEENT:  anicteric, moist mucous membranes  CHEST/LUNG:  CTA b/l, no rales, wheezes, or rhonchi  HEART:  RRR, S1, S2  ABDOMEN:  BS+, soft, nontender, nondistended  EXTREMITIES: L knee pain, limited ROM, swollen prepatellar area, tender to palpation, L hand swollen and erythematous  NERVOUS SYSTEM: answers questions and follows commands appropriately    LABS:                        9.9    13.99 )-----------( 213      ( 22 Jul 2022 07:45 )             31.1     CBC Full  -  ( 22 Jul 2022 07:45 )  WBC Count : 13.99 K/uL  Hemoglobin : 9.9 g/dL  Hematocrit : 31.1 %  Platelet Count - Automated : 213 K/uL  Mean Cell Volume : 81.2 fl  Mean Cell Hemoglobin : 25.8 pg  Mean Cell Hemoglobin Concentration : 31.8 gm/dL  Auto Neutrophil # : 11.73 K/uL  Auto Lymphocyte # : 0.94 K/uL  Auto Monocyte # : 1.22 K/uL  Auto Eosinophil # : 0.01 K/uL  Auto Basophil # : 0.02 K/uL  Auto Neutrophil % : 83.9 %  Auto Lymphocyte % : 6.7 %  Auto Monocyte % : 8.7 %  Auto Eosinophil % : 0.1 %  Auto Basophil % : 0.1 %    22 Jul 2022 07:45    139    |  112    |  10     ----------------------------<  85     4.2     |  17     |  0.73     Ca    8.3        22 Jul 2022 07:45    TPro  5.5    /  Alb  2.6    /  TBili  0.6    /  DBili  0.2    /  AST  20     /  ALT  12     /  AlkPhos  53     22 Jul 2022 07:45    PT/INR - ( 22 Jul 2022 07:45 )   PT: 13.7 sec;   INR: 1.17 ratio             CAPILLARY BLOOD GLUCOSE            Culture - Blood (collected 07-20-22 @ 05:10)  Source: .Blood Blood-Peripheral  Preliminary Report (07-21-22 @ 10:01):    No growth to date.    Culture - Blood (collected 07-20-22 @ 05:10)  Source: .Blood Blood-Peripheral  Preliminary Report (07-21-22 @ 10:01):    No growth to date.        RADIOLOGY & ADDITIONAL TESTS:  < from: Xray Knee 3 Views, Left (07.22.22 @ 09:50) >    IMPRESSION: Degeneration as above. Small effusion.    There is amorphous calcification posterior to the knee joint possibly in   a Baker's cyst.    --- End of Report ---    < end of copied text >    < from: US Duplex Venous Upper Ext Ltd, Left (07.22.22 @ 11:04) >    IMPRESSION:  No evidence of left upper extremity deep venous thrombosis.    < end of copied text >      < from: US Duplex Venous Lower Ext Ltd, Left (07.22.22 @ 11:04) >  IMPRESSION:  No evidence of left lower extremity deep venous thrombosis.          --- End of Report ---    < end of copied text >    Personally reviewed.     Consultant(s) Notes Reviewed:  [x] YES  [ ] NO

## 2022-07-22 NOTE — PROGRESS NOTE ADULT - ASSESSMENT
covid  rectal bleed    no further overt GI bleeding  suspect resolved hemorrhoidal bleed  cbc noted  monitor BMs  outpatient f/u with primary GI    I reviewed the overnight course of events on the unit, re-confirming the patient history. I discussed the care with the patient and their family  Differential diagnosis and plan of care discussed with patient after the evaluation  35 minutes spent on total encounter of which more than fifty percent of the encounter was spent counseling and/or coordinating care by the attending physician.  Advanced care planning was discussed with patient and family.  Advanced care planning forms were reviewed and discussed.  Risks, benefits and alternatives of gastroenterologic procedures were discussed in detail and all questions were answered.

## 2022-07-22 NOTE — PROGRESS NOTE ADULT - PROBLEM SELECTOR PLAN 8
DVT ppx: SCDs. Avoid pharmacologic dvt ppx given GIB
Patient is s/p stents (2002) and s/p PPM (2016)  - Hold home ASA  - statin resumed

## 2022-07-22 NOTE — CONSULT NOTE ADULT - SUBJECTIVE AND OBJECTIVE BOX
[facility] DIVISION of  INFECTIOUS DISEASE  Eugenio Willis MD PhD, Mary Jane Haro MD, Gretchen Eckert MD, Maicol Bowman MD, Ran Adrian MD  and providing coverage with Mercedez Villaseñor MD  Providing Infectious Disease Consultations at [facility], [facility], [facility]'s    Office# [phone] to schedule follow up appointments  Answering Service for urgent calls or New Consults [phone]  Cell# to text for urgent issues Eugenio Lazaro [phone]     HPI:  88 year old female with PMHx of CAD s/p stents (2002), s/p PPM (2016), Hypertension, Hyperlipidemia, Hypothyroidism, hearing loss BIBA from home c/o BRBPR that began this evening. History is supplemented by patient's daughter (Magno) at bedside. Patient presented to [facility] ED 4 days ago c/o weakness and decreased PO intake after recent COVID dx July 7/14 after attending a gatherting with people with COVID. She was diagnosed with a mild UTI at that time and was discharged from the ED s/p IVF. Patient presented to PMD office c/o persistent weakness and was prescribed Cefpodoxime for UTI. Patient took one dose of Cefpodoxime and subsequently felt nauseous, vomited x 2-3 and noticed BRBPR when she went to the bathroom. She denies blood in her vomit and is unable to quantify the amount of blood in her stool. She admits to diffuse achy abdominal pain. Denies fever/chills, headache, dizziness, shortness of breath, chest pain. Of note, patient's daughter states she has had bouts of colitis in the past with associated BRBPR for which she was never hospitalized.    CT a/p: Sigmoid diverticulosis without evidence of diverticulitis. Mild fluid distention of the colon which may be reflective of diarrhea.Moderate-sized sliding hiatus hernia.  Given: IV Protonix x1, 1L IV NS x1    PAST MEDICAL & SURGICAL HISTORY:  CAD (coronary artery disease) 1 stent (2002) and PPM (2016)  HTN (hypertension)  HLD (hyperlipidemia)  GERD (gastroesophageal reflux disease)  Hypothyroid      No significant past surgical history      Antimicrobials  cefTRIAXone   IVPB 1000 milliGRAM(s) IV Intermittent every 24 hours      Immunological      Other  pantoprazole  Injectable 40 milliGRAM(s) IV Push two times a day  potassium chloride   Powder 40 milliEquivalent(s) Oral once  sodium chloride 0.9%. 1000 milliLiter(s) IV Continuous <Continuous>      Allergies    No Known Allergies    Intolerances        SOCIAL HISTORY:  Social History:  Tobacco: denies  EtOH:  denies  Recreational drug use:  denies  Lives with: daughter at home  Ambulates: with a cane  ADLs: independent  Vaccinations: denies COVID vacc (20 Jul 2022 01:28)      FAMILY HISTORY:      ROS:    no cough, no chest pain, neg except per HPI    Vital Signs Last 24 Hrs  T(C): 36.6 (20 Jul 2022 11:12), Max: 36.6 (20 Jul 2022 11:12)  T(F): 97.9 (20 Jul 2022 11:12), Max: 97.9 (20 Jul 2022 11:12)  HR: 82 (20 Jul 2022 11:12) (69 - 97)  BP: 132/56 (20 Jul 2022 11:12) (90/43 - 132/56)  BP(mean): --  RR: 16 (20 Jul 2022 11:12) (16 - 98)  SpO2: 96% (20 Jul 2022 11:12) (96% - 98%)    Parameters below as of 20 Jul 2022 11:12  Patient On (Oxygen Delivery Method): room air        PE:  NAD  HEENT:  NC, atraumatic  Lungs:  No accessory muscle use, breathing comfortably  Cor:  distant  Abd:  Symmetric, appears normal, , tender in suprapubic area  Extrem:  No cyanosis        LABS:                        11.0   14.71 )-----------( 263      ( 20 Jul 2022 03:10 )             33.8       WBC Count: 14.71 K/uL (07-20-22 @ 03:10)  WBC Count: 18.02 K/uL (07-19-22 @ 23:46)      07-20    143  |  116<H>  |  18  ----------------------------<  159<H>  3.3<L>   |  16<L>  |  1.20    Ca    8.2<L>      20 Jul 2022 03:10    TPro  5.8<L>  /  Alb  3.0<L>  /  TBili  0.4  /  DBili  x   /  AST  20  /  ALT  13  /  AlkPhos  52  07-20      Creatinine, Serum: 1.20 mg/dL (07-20-22 @ 03:10)  Creatinine, Serum: 1.60 mg/dL (07-19-22 @ 23:46)        COVID RISK SCORE:  Auto Neutrophil #: 13.31 K/uL (07-20-22 @ 03:10)  Auto Lymphocyte #: 0.23 K/uL (07-20-22 @ 03:10)  Auto Neutrophil #: 15.79 K/uL (07-19-22 @ 23:46)  Auto Lymphocyte #: 0.52 K/uL (07-19-22 @ 23:46)      Auto Eosinophil #: 0.02 K/uL (07-20-22 @ 03:10)  Auto Eosinophil #: 0.05 K/uL (07-19-22 @ 23:46)    INR: 1.06 ratio (07-19-22 @ 23:46)  Activated Partial Thromboplastin Time: 25.2 sec (07-19-22 @ 23:46)          MICROBIOLOGY:    Culture - Urine (09.05.18 @ 08:44)    -  Amikacin: S <=8    -  Amoxicillin/Clavulanic Acid: S <=8/4    -  Ampicillin: S <=2 These ampicillin results predict results for amoxicillin    -  Ampicillin/Sulbactam: S <=4/2    -  Aztreonam: S <=4    -  Cefazolin: S <=2 For uncomplicated UTI with K. pneumoniae, E. coli, or P. mirablis: POLI <=16 is sensitive and POLI >=32 is resistant. This also predicts results for oral agents cefaclor, cefdinir, cefpodoxime, cefprozil, cefuroxime axetil, cephalexin and locarbef for uncomplicated UTI. Note that some isolates may be susceptible to these agents while testing resistant to cefazolin.    -  Cefepime: S <=2    -  Cefoxitin: S <=4    -  Ceftriaxone: S <=1 Enterobacter, Citrobacter, and Serratia may develop resistance during prolonged therapy    -  Ciprofloxacin: S <=0.5    -  Ertapenem: S <=0.5    -  Gentamicin: S <=1    -  Levofloxacin: S <=1    -  Meropenem: S <=1    -  Nitrofurantoin: R >64 Should not be used to treat pyelonephritis    -  Piperacillin/Tazobactam: S <=8    -  Tobramycin: S <=2    -  Trimethoprim/Sulfamethoxazole: S <=0.5/9.5    Specimen Source: .Urine Clean Catch (Midstream)    Culture Results:   50,000 - 99,000 CFU/mL Proteus mirabilis    Organism Identification: Proteus mirabilis    Organism: Proteus mirabilis    Method Type: POLI        COVID-19 PCR: Detected (19 Jul 2022 23:46)      RADIOLOGY & ADDITIONAL STUDIES:    --
Pt Name: MATI GUNN    MRN: [id number]    HPI: Patient is a 88y Female who was admitted to medicine with a chief complaint of GIB (21 Jul 2022 16:51). It was also found that she had a UTI that was treated with Rocephin and planned for discharge with Keflex. Orthopedics was consulted because of concerns of L knee septic arthritis due to swelling, pain, and limited ROM. Pt endorsed that her knee has been in pain for several days but denies any recent history of trauma or falls. Pt complains of knee pain, especially when she tries to move it. She also endorsed that she's able to ambulate, but prefers not to. She denies use of immunosuppressants or steroids. Otherwise, she denies any fever, chills, or any other orthopedic complaints at this time.      PAST MEDICAL & SURGICAL HISTORY:  CAD (coronary artery disease)  1 stent (2002) and PPM (2016)      HTN (hypertension)      HLD (hyperlipidemia)      GERD (gastroesophageal reflux disease)      Hypothyroid      No significant past surgical history        Allergies: No Known Allergies      Ambulation: Baseline Ambulation With Walker.                          9.9    13.99 )-----------( 213      ( 22 Jul 2022 07:45 )             31.1     07-22    139  |  112<H>  |  10  ----------------------------<  85  4.2   |  17<L>  |  0.73    Ca    8.3<L>      22 Jul 2022 07:45    TPro  5.5<L>  /  Alb  2.6<L>  /  TBili  0.6  /  DBili  0.2  /  AST  20  /  ALT  12  /  AlkPhos  53  07-22    PT/INR - ( 22 Jul 2022 07:45 )   PT: 13.7 sec;   INR: 1.17 ratio           Vital Signs Last 24 Hrs  T(C): 37.3 (22 Jul 2022 04:35), Max: 37.3 (21 Jul 2022 13:06)  T(F): 99.1 (22 Jul 2022 04:35), Max: 99.1 (21 Jul 2022 13:06)  HR: 93 (22 Jul 2022 04:35) (81 - 93)  BP: 134/60 (22 Jul 2022 04:35) (112/64 - 134/60)  RR: 18 (22 Jul 2022 04:35) (18 - 18)  SpO2: 95% (22 Jul 2022 04:35) (95% - 98%)          Physical Exam:    General: Alert and NAD    LLE:  Knee appears swollen with effusion, no abrasions or lacerations, no erythema  Limited knee ROM with pain (30 flexion, 10 extension)  TTP diffusely around the knee, but most notably along the joint lines (lateral >medial)  + Gsc/TA/EHL/FHL  SILT: SPN/DPN/Saph/Surr  2+ DP      Imaging:   Knee Xrays demonstrate no obvious fractures, mild joint effusion, and chondrocalcinosis.    Orthopedic A/P:      Pt is a 88y Female with L knee pain likely due to chondrocalcinosis    -Unlikely septic arthritis due to lack of fever and normal ESR of 13. Knee pain has also been occurring for several days without erythema or obvious warmness to touch. Blood cultures were drawn 2 days ago and have yet to grow anything. Pt's knee is likely due to chondrocalcinosis over septic arthritis of joint.  -Indomethacin is currently contraindicated due to recent GI bleed.  -Trial dose of IV steroids recommended while pt is admitted.  -If steroid dose helps with knee pain, then this further reinforces that knee pain is from chondrocalcinosis and pt would be stable from orthopedic standpoint for discharge.  -Pt  educated on signs of septic arthritis and should return to ED if knee swells further, becomes erythematous, or she develops new fever.  -Pain control PRN  -WBAT  -Discussed above with pt at bedside, who understood and agrees with plan  -Follow up with Dr. Andujar in office, call to make an appointment.   -Discussed with Dr. Andujar who is aware and approves of plan.       
[facility]  Chandrakant Brown PA-C  [address] [place] [address] [id number] [phone]      Chief Complaint:  Patient is a 88y old  Female who presents with a chief complaint of GIB (2022 14:08)      HPI:88 year old female with PMHx of CAD s/p stents (), s/p PPM (), Hypertension, Hyperlipidemia, Hypothyroidism, hearing loss BIBA from home c/o BRBPR that began this evening. History is supplemented by patient's daughter (Magno) at bedside. Patient presented to [facility] ED 4 days ago c/o weakness and decreased PO intake after recent COVID dx at the beginning of July. She was diagnosed with a mild UTI at that time and was discharged from the ED s/p IVF. Earlier this evening, patient presented to PMD office c/o persistent weakness and was prescribed Cefpodoxime for UTI. Patient took one dose of Cefpodoxime and subsequently felt nauseous, vomited x 2-3 and noticed BRBPR when she went to the bathroom. She denies blood in her vomit and is unable to quantify the amount of blood in her stool. She admits to diffuse achy abdominal pain. Denies fever/chills, headache, dizziness, shortness of breath, chest pain. Of note, patient's daughter states she has had bouts of colitis in the past with associated BRBPR for which she was never hospitalized.    Allergies:  No Known Allergies      Medications:  atorvastatin 10 milliGRAM(s) Oral at bedtime  cefTRIAXone   IVPB 1000 milliGRAM(s) IV Intermittent every 24 hours  levothyroxine 50 MICROGram(s) Oral daily  pantoprazole  Injectable 40 milliGRAM(s) IV Push two times a day  sodium chloride 0.9%. 1000 milliLiter(s) IV Continuous <Continuous>      PMHX/PSHX:  CAD (coronary artery disease)    HTN (hypertension)    HLD (hyperlipidemia)    GERD (gastroesophageal reflux disease)    Hypothyroid    No significant past surgical history        Family history:  No pertinent family history in first degree relatives        Social History:     ROS:     General:  No wt loss, fevers, chills, night sweats, fatigue,   Eyes:  Good vision, no reported pain  ENT:  No sore throat, pain, runny nose, dysphagia  CV:  No pain, palpitations, hypo/hypertension  Resp:  No dyspnea, cough, tachypnea, wheezing  GI:  No pain, No nausea, No vomiting, No diarrhea, No constipation, No weight loss, No fever, No pruritis, No rectal bleeding, No tarry stools, No dysphagia,  :  No pain, bleeding, incontinence, nocturia  Muscle:  No pain, weakness  Neuro:  No weakness, tingling, memory problems  Psych:  No fatigue, insomnia, mood problems, depression  Endocrine:  No polyuria, polydipsia, cold/heat intolerance  Heme:  No petechiae, ecchymosis, easy bruisability  Skin:  No rash, tattoos, scars, edema      PHYSICAL EXAM:   Vital Signs:  Vital Signs Last 24 Hrs  T(C): 36.6 (2022 11:12), Max: 36.6 (2022 11:12)  T(F): 97.9 (2022 11:12), Max: 97.9 (2022 11:12)  HR: 82 (2022 11:12) (69 - 97)  BP: 132/56 (2022 11:12) (90/43 - 132/56)  BP(mean): --  RR: 16 (2022 11:12) (16 - 98)  SpO2: 96% (2022 11:12) (96% - 98%)    Parameters below as of 2022 11:12  Patient On (Oxygen Delivery Method): room air      Daily Height in cm: 149.86 (2022 22:53)    Daily Weight in k (2022 13:10)    GENERAL:  Appears stated age,   HEENT:  NC/AT,    CHEST:  Full & symmetric excursion,   HEART:  Regular rhythm  ABDOMEN:  Soft, non-tender, non-distended,   EXTEREMITIES:  no cyanosis,clubbing or edema  SKIN:  No rash  NEURO:  Alert,    LABS:                        11.0   14.71 )-----------( 263      ( 2022 03:10 )             33.8     07-20    x   |  x   |  x   ----------------------------<  x   x    |  x   |  1.00    Ca    8.2<L>      2022 03:10    TPro  5.7<L>  /  Alb  2.9<L>  /  TBili  0.4  /  DBili  <0.1  /  AST  29  /  ALT  16  /  AlkPhos  51  07-20    LIVER FUNCTIONS - ( 2022 14:30 )  Alb: 2.9 g/dL / Pro: 5.7 g/dL / ALK PHOS: 51 U/L / ALT: 16 U/L / AST: 29 U/L / GGT: x           PT/INR - ( 2022 13:54 )   PT: 13.3 sec;   INR: 1.14 ratio         PTT - ( 2022 23:46 )  PTT:25.2 sec        Imaging:

## 2022-07-22 NOTE — CHART NOTE - NSCHARTNOTEFT_GEN_A_CORE
Pt was reaxamined after steroid administration. Pt was seen sitting in chair and endorsed that she was able to ambulate from the bed to chair. Pt still had L knee pain but less severe than earlier with improved ROM of the knee. ESR was normal and CRP was high at 116. However, pt was noted to have diverticulosis and UTI/Pyelonephritis, so CRP value may not necessarily be related to knee pathology. Overall, it is unlikely that the pt has septic joint given the lack of fever, chronicity of pain, lack of erythema, ability to bear weight and ambulate, and improved pain after steroid administration. Pt was instructed to go to ER if any signs of septic joint, such as red swollen joint and inability to bear weight. Pt is stable for discharge at this point in time and it is recommended that she follows up with a rheumatologist outpatient for potential pseudogout.

## 2022-07-22 NOTE — PROGRESS NOTE ADULT - PROBLEM SELECTOR PLAN 4
BP remains soft off of antihypertensives  - S/p 1L IV NS in the ED  - Hold home Losartan, Metoprolol, Amlodipine  - Monitor routine hemodynamics
improving  -cleared by ID for discharge

## 2022-07-22 NOTE — PROGRESS NOTE ADULT - PROBLEM SELECTOR PLAN 5
BASHIR likely pre-renal in the setting of dehydration, GIB, poor PO intake, RESOLVED  - Cr 1.6 on admission, baseline Cr unknown  - Monitor BMP  - Avoid nephrotoxic medications  - Monitor renal indices
Patient was dx with mild UTI at [facility] ED 4 days ago, not started on abx. She visited PCP today and was prescribed Cefpodoxime  - UA at [facility] positive for nitrates and leukocytes  - Continue IV Rocephin, switch to PO abx on discharge  - blood cx NGTD, urine cx pending  - Trend WBC and monitor for fever  - ID (Dr. Travis) consulted, f/u recs

## 2022-07-22 NOTE — PROGRESS NOTE ADULT - PROBLEM SELECTOR PLAN 1
Patient presents with abdominal pain, BRBPR, fatigue likely 2/2 GIB  - Hgb 11.8 on admission, wnl, slight downtrend this AM, repeat CBC with improved Hb  - FOBT +  - CT a/p: Sigmoid diverticulosis without evidence of diverticulitis. Mild fluid distention of the colon which may be reflective of diarrhea. Moderate-sized sliding hiatus hernia.  - tolerating reg diet  - Protonix 40 mg IVP BID  - Hold home ASA  - SCDs, hold pharmacologic DVT ppx  - Currently hemodynamically stable, monitor for signs and symptoms of active bleeding  - F/u vitamin B12, folate, iron panel: iron, ferritin, TIBC, transferrin - mild iron deficiency, start iron tabs  - GI (Dr. Morales) consulted, f/u recs
Patient presents with abdominal pain, BRBPR, fatigue likely 2/2 GIB  - Hgb 11.8 on admission, wnl, slight downtrend this AM, repeat CBC with improved Hb  - FOBT +  - CT a/p: Sigmoid diverticulosis without evidence of diverticulitis. Mild fluid distention of the colon which may be reflective of diarrhea. Moderate-sized sliding hiatus hernia.  - tolerating reg diet  - Protonix 40 mg IVP BID  - Hold home ASA  - SCDs, hold pharmacologic DVT ppx  - Currently hemodynamically stable, monitor for signs and symptoms of active bleeding  - F/u vitamin B12, folate, iron panel: iron, ferritin, TIBC, transferrin - mild iron deficiency, start iron tabs  - GI (Dr. Morales) consulted, f/u recs

## 2022-07-22 NOTE — PROGRESS NOTE ADULT - ASSESSMENT
88 year old female with PMHx of CAD s/p stents (2002), s/p PPM (2016), Hypertension, Hyperlipidemia, Hypothyroidism, hearing loss admitted for GIB, course complicated by acute left knee pain

## 2022-07-22 NOTE — PROGRESS NOTE ADULT - SUBJECTIVE AND OBJECTIVE BOX
[facility] DIVISION of INFECTIOUS DISEASE  Eugenio Willis MD PhD, Mary Jane Haro MD, Gretchen Eckert MD, Maicol Bowman MD, Ran Adrian MD  and providing coverage with Mercedez Villaseñor MD  Providing Infectious Disease Consultations at [facility], [facility], [place], [facility], [facility]'s    Office# [phone] to schedule follow up appointments  Answering Service for urgent calls or New Consults [phone]  Cell# to text for urgent issues Eugenio Willis [phone]     infectious diseases progress note:    MATI GUNN is a 88y y. o. Female patient    Overnight and events of the last 24hrs reviewed    Allergies    No Known Allergies    Intolerances        ANTIBIOTICS/RELEVANT:  antimicrobials  cefTRIAXone   IVPB 1000 milliGRAM(s) IV Intermittent every 24 hours    immunologic:    OTHER:  acetaminophen     Tablet .. 650 milliGRAM(s) Oral every 6 hours PRN  atorvastatin 10 milliGRAM(s) Oral at bedtime  levothyroxine 50 MICROGram(s) Oral <User Schedule>  levothyroxine 75 MICROGram(s) Oral <User Schedule>  loperamide 2 milliGRAM(s) Oral once  pantoprazole  Injectable 40 milliGRAM(s) IV Push two times a day  sodium chloride 0.9%. 1000 milliLiter(s) IV Continuous <Continuous>      Objective:  Vital Signs Last 24 Hrs  T(C): 37.3 (22 Jul 2022 04:35), Max: 37.3 (21 Jul 2022 13:06)  T(F): 99.1 (22 Jul 2022 04:35), Max: 99.1 (21 Jul 2022 13:06)  HR: 93 (22 Jul 2022 04:35) (81 - 93)  BP: 134/60 (22 Jul 2022 04:35) (112/64 - 134/60)  BP(mean): --  RR: 18 (22 Jul 2022 04:35) (18 - 18)  SpO2: 95% (22 Jul 2022 04:35) (95% - 98%)    Parameters below as of 22 Jul 2022 04:35  Patient On (Oxygen Delivery Method): room air        T(C): 37.3 (07-22-22 @ 04:35), Max: 37.3 (07-21-22 @ 13:06)  T(C): 37.3 (07-22-22 @ 04:35), Max: 37.3 (07-21-22 @ 13:06)  T(C): 37.3 (07-22-22 @ 04:35), Max: 37.3 (07-21-22 @ 13:06)    PHYSICAL EXAM:  HEENT: NC atraumatic  Neck: supple  Respiratory: no accessory muscle use, breathing comfortably  Cardiovascular: distant  Gastrointestinal: normal appearing, nondistended  Extremities: no clubbing, no cyanosis, LLE with girth L>R, tender and swollen in prepatellar region inferior to the patella, not warm, not red        LABS:                          9.9    13.99 )-----------( 213      ( 22 Jul 2022 07:45 )             31.1       WBC  13.99 07-22 @ 07:45  18.87 07-21 @ 15:20  10.40 07-21 @ 07:40  14.71 07-20 @ 03:10  18.02 07-19 @ 23:46      07-22    139  |  112<H>  |  10  ----------------------------<  85  4.2   |  17<L>  |  0.73    Ca    8.3<L>      22 Jul 2022 07:45    TPro  5.5<L>  /  Alb  2.6<L>  /  TBili  0.6  /  DBili  0.2  /  AST  20  /  ALT  12  /  AlkPhos  53  07-22      Creatinine, Serum: 0.73 mg/dL (07-22-22 @ 07:45)  Creatinine, Serum: 0.86 mg/dL (07-21-22 @ 07:40)  Creatinine, Serum: 1.00 mg/dL (07-20-22 @ 14:30)  Creatinine, Serum: 1.20 mg/dL (07-20-22 @ 03:10)  Creatinine, Serum: 1.60 mg/dL (07-19-22 @ 23:46)      PT/INR - ( 22 Jul 2022 07:45 )   PT: 13.7 sec;   INR: 1.17 ratio                   INFLAMMATORY MARKERS  Auto Neutrophil #: 11.73 K/uL (07-22-22 @ 07:45)  Auto Lymphocyte #: 0.94 K/uL (07-22-22 @ 07:45)  Auto Neutrophil #: 7.76 K/uL (07-21-22 @ 07:40)  Auto Lymphocyte #: 1.53 K/uL (07-21-22 @ 07:40)  Auto Neutrophil #: 13.31 K/uL (07-20-22 @ 03:10)  Auto Lymphocyte #: 0.23 K/uL (07-20-22 @ 03:10)  Auto Neutrophil #: 15.79 K/uL (07-19-22 @ 23:46)  Auto Lymphocyte #: 0.52 K/uL (07-19-22 @ 23:46)      Auto Eosinophil #: 0.01 K/uL (07-22-22 @ 07:45)  Auto Eosinophil #: 0.07 K/uL (07-21-22 @ 07:40)  Auto Eosinophil #: 0.02 K/uL (07-20-22 @ 03:10)  Auto Eosinophil #: 0.05 K/uL (07-19-22 @ 23:46)      Sedimentation Rate, Erythrocyte: 13 mm/hr (07-22-22 @ 07:45)              Ferritin, Serum: 39 ng/mL (07-20-22 @ 03:10)        INR: 1.17 ratio (07-22-22 @ 07:45)  INR: 1.14 ratio (07-21-22 @ 07:40)  INR: 1.14 ratio (07-20-22 @ 13:54)  INR: 1.06 ratio (07-19-22 @ 23:46)  Activated Partial Thromboplastin Time: 25.2 sec (07-19-22 @ 23:46)          MICROBIOLOGY:              RADIOLOGY & ADDITIONAL STUDIES:

## 2022-07-22 NOTE — PROGRESS NOTE ADULT - PROBLEM SELECTOR PLAN 3
Patient was dx with mild UTI at [facility] ED 4 days ago, not started on abx. She visited PCP today and was prescribed Cefpodoxime  - UA at [facility] positive for nitrates and leukocytes  - WBC 18.02 on admission, improved initially but worsened in this afternoons labs, REPEAT CBC with diff in AM  - Continue IV Rocephin, switch to PO abx on discharge  - blood cx NGTD, urine cx pending  - Trend WBC and monitor for fever  - ID (Dr. Travis) consulted, f/u recs
L hand erythema and swelling around IV site  -IV removed and replaced on other arm  -doppler negative for DVT  -warm compresses PRN

## 2022-07-23 ENCOUNTER — TRANSCRIPTION ENCOUNTER (OUTPATIENT)
Age: 87
End: 2022-07-23

## 2022-07-23 VITALS
HEART RATE: 90 BPM | DIASTOLIC BLOOD PRESSURE: 72 MMHG | SYSTOLIC BLOOD PRESSURE: 119 MMHG | OXYGEN SATURATION: 97 % | TEMPERATURE: 98 F | RESPIRATION RATE: 17 BRPM

## 2022-07-23 LAB
ALBUMIN SERPL ELPH-MCNC: 2.7 G/DL — LOW (ref 3.3–5)
ALP SERPL-CCNC: 61 U/L — SIGNIFICANT CHANGE UP (ref 40–120)
ALT FLD-CCNC: 10 U/L — LOW (ref 12–78)
ANION GAP SERPL CALC-SCNC: 11 MMOL/L — SIGNIFICANT CHANGE UP (ref 5–17)
AST SERPL-CCNC: 15 U/L — SIGNIFICANT CHANGE UP (ref 15–37)
BASOPHILS # BLD AUTO: 0.01 K/UL — SIGNIFICANT CHANGE UP (ref 0–0.2)
BASOPHILS NFR BLD AUTO: 0.1 % — SIGNIFICANT CHANGE UP (ref 0–2)
BILIRUB DIRECT SERPL-MCNC: 0.2 MG/DL — SIGNIFICANT CHANGE UP (ref 0–0.3)
BILIRUB INDIRECT FLD-MCNC: 0.4 MG/DL — SIGNIFICANT CHANGE UP (ref 0.2–1)
BILIRUB SERPL-MCNC: 0.6 MG/DL — SIGNIFICANT CHANGE UP (ref 0.2–1.2)
BUN SERPL-MCNC: 10 MG/DL — SIGNIFICANT CHANGE UP (ref 7–23)
CALCIUM SERPL-MCNC: 8.7 MG/DL — SIGNIFICANT CHANGE UP (ref 8.5–10.1)
CHLORIDE SERPL-SCNC: 110 MMOL/L — HIGH (ref 96–108)
CO2 SERPL-SCNC: 17 MMOL/L — LOW (ref 22–31)
CREAT SERPL-MCNC: 0.61 MG/DL — SIGNIFICANT CHANGE UP (ref 0.5–1.3)
EGFR: 86 ML/MIN/1.73M2 — SIGNIFICANT CHANGE UP
EOSINOPHIL # BLD AUTO: 0 K/UL — SIGNIFICANT CHANGE UP (ref 0–0.5)
EOSINOPHIL NFR BLD AUTO: 0 % — SIGNIFICANT CHANGE UP (ref 0–6)
GLUCOSE SERPL-MCNC: 177 MG/DL — HIGH (ref 70–99)
HCT VFR BLD CALC: 33.2 % — LOW (ref 34.5–45)
HGB BLD-MCNC: 10.7 G/DL — LOW (ref 11.5–15.5)
IMM GRANULOCYTES NFR BLD AUTO: 1.1 % — SIGNIFICANT CHANGE UP (ref 0–1.5)
INR BLD: 1.08 RATIO — SIGNIFICANT CHANGE UP (ref 0.88–1.16)
LYMPHOCYTES # BLD AUTO: 0.6 K/UL — LOW (ref 1–3.3)
LYMPHOCYTES # BLD AUTO: 4.2 % — LOW (ref 13–44)
MCHC RBC-ENTMCNC: 26.2 PG — LOW (ref 27–34)
MCHC RBC-ENTMCNC: 32.2 GM/DL — SIGNIFICANT CHANGE UP (ref 32–36)
MCV RBC AUTO: 81.4 FL — SIGNIFICANT CHANGE UP (ref 80–100)
MONOCYTES # BLD AUTO: 0.33 K/UL — SIGNIFICANT CHANGE UP (ref 0–0.9)
MONOCYTES NFR BLD AUTO: 2.3 % — SIGNIFICANT CHANGE UP (ref 2–14)
NEUTROPHILS # BLD AUTO: 13.28 K/UL — HIGH (ref 1.8–7.4)
NEUTROPHILS NFR BLD AUTO: 92.3 % — HIGH (ref 43–77)
NRBC # BLD: 0 /100 WBCS — SIGNIFICANT CHANGE UP (ref 0–0)
PLATELET # BLD AUTO: 220 K/UL — SIGNIFICANT CHANGE UP (ref 150–400)
POTASSIUM SERPL-MCNC: 3.7 MMOL/L — SIGNIFICANT CHANGE UP (ref 3.5–5.3)
POTASSIUM SERPL-SCNC: 3.7 MMOL/L — SIGNIFICANT CHANGE UP (ref 3.5–5.3)
PROT SERPL-MCNC: 5.9 G/DL — LOW (ref 6–8.3)
PROTHROM AB SERPL-ACNC: 12.6 SEC — SIGNIFICANT CHANGE UP (ref 10.5–13.4)
RBC # BLD: 4.08 M/UL — SIGNIFICANT CHANGE UP (ref 3.8–5.2)
RBC # FLD: 15.2 % — HIGH (ref 10.3–14.5)
SODIUM SERPL-SCNC: 138 MMOL/L — SIGNIFICANT CHANGE UP (ref 135–145)
WBC # BLD: 14.38 K/UL — HIGH (ref 3.8–10.5)
WBC # FLD AUTO: 14.38 K/UL — HIGH (ref 3.8–10.5)

## 2022-07-23 PROCEDURE — 85652 RBC SED RATE AUTOMATED: CPT

## 2022-07-23 PROCEDURE — 80048 BASIC METABOLIC PNL TOTAL CA: CPT

## 2022-07-23 PROCEDURE — 82728 ASSAY OF FERRITIN: CPT

## 2022-07-23 PROCEDURE — 74176 CT ABD & PELVIS W/O CONTRAST: CPT | Mod: MA

## 2022-07-23 PROCEDURE — 87635 SARS-COV-2 COVID-19 AMP PRB: CPT

## 2022-07-23 PROCEDURE — 80076 HEPATIC FUNCTION PANEL: CPT

## 2022-07-23 PROCEDURE — 99285 EMERGENCY DEPT VISIT HI MDM: CPT | Mod: 25

## 2022-07-23 PROCEDURE — 87077 CULTURE AEROBIC IDENTIFY: CPT

## 2022-07-23 PROCEDURE — 99239 HOSP IP/OBS DSCHRG MGMT >30: CPT

## 2022-07-23 PROCEDURE — 86901 BLOOD TYPING SEROLOGIC RH(D): CPT

## 2022-07-23 PROCEDURE — 96374 THER/PROPH/DIAG INJ IV PUSH: CPT

## 2022-07-23 PROCEDURE — 86900 BLOOD TYPING SEROLOGIC ABO: CPT

## 2022-07-23 PROCEDURE — 85730 THROMBOPLASTIN TIME PARTIAL: CPT

## 2022-07-23 PROCEDURE — 86140 C-REACTIVE PROTEIN: CPT

## 2022-07-23 PROCEDURE — 87045 FECES CULTURE AEROBIC BACT: CPT

## 2022-07-23 PROCEDURE — 87046 STOOL CULTR AEROBIC BACT EA: CPT

## 2022-07-23 PROCEDURE — 82272 OCCULT BLD FECES 1-3 TESTS: CPT

## 2022-07-23 PROCEDURE — 97162 PT EVAL MOD COMPLEX 30 MIN: CPT

## 2022-07-23 PROCEDURE — 84466 ASSAY OF TRANSFERRIN: CPT

## 2022-07-23 PROCEDURE — 93005 ELECTROCARDIOGRAM TRACING: CPT

## 2022-07-23 PROCEDURE — 85025 COMPLETE CBC W/AUTO DIFF WBC: CPT

## 2022-07-23 PROCEDURE — 80053 COMPREHEN METABOLIC PANEL: CPT

## 2022-07-23 PROCEDURE — 82565 ASSAY OF CREATININE: CPT

## 2022-07-23 PROCEDURE — 97116 GAIT TRAINING THERAPY: CPT

## 2022-07-23 PROCEDURE — 87040 BLOOD CULTURE FOR BACTERIA: CPT

## 2022-07-23 PROCEDURE — 36415 COLL VENOUS BLD VENIPUNCTURE: CPT

## 2022-07-23 PROCEDURE — 83550 IRON BINDING TEST: CPT

## 2022-07-23 PROCEDURE — 85610 PROTHROMBIN TIME: CPT

## 2022-07-23 PROCEDURE — 87507 IADNA-DNA/RNA PROBE TQ 12-25: CPT

## 2022-07-23 PROCEDURE — 82607 VITAMIN B-12: CPT

## 2022-07-23 PROCEDURE — 93971 EXTREMITY STUDY: CPT

## 2022-07-23 PROCEDURE — 97530 THERAPEUTIC ACTIVITIES: CPT

## 2022-07-23 PROCEDURE — 85027 COMPLETE CBC AUTOMATED: CPT

## 2022-07-23 PROCEDURE — 86850 RBC ANTIBODY SCREEN: CPT

## 2022-07-23 PROCEDURE — 82746 ASSAY OF FOLIC ACID SERUM: CPT

## 2022-07-23 PROCEDURE — 71045 X-RAY EXAM CHEST 1 VIEW: CPT

## 2022-07-23 PROCEDURE — 73562 X-RAY EXAM OF KNEE 3: CPT

## 2022-07-23 PROCEDURE — 83540 ASSAY OF IRON: CPT

## 2022-07-23 RX ORDER — LOSARTAN POTASSIUM 100 MG/1
1 TABLET, FILM COATED ORAL
Qty: 0 | Refills: 0 | DISCHARGE

## 2022-07-23 RX ORDER — CEPHALEXIN 500 MG
1 CAPSULE ORAL
Qty: 24 | Refills: 0
Start: 2022-07-23 | End: 2022-07-28

## 2022-07-23 RX ORDER — AZITHROMYCIN 500 MG/1
1000 TABLET, FILM COATED ORAL ONCE
Refills: 0 | Status: COMPLETED | OUTPATIENT
Start: 2022-07-23 | End: 2022-07-23

## 2022-07-23 RX ORDER — AMLODIPINE BESYLATE 2.5 MG/1
1 TABLET ORAL
Qty: 0 | Refills: 0 | DISCHARGE

## 2022-07-23 RX ORDER — ASPIRIN/CALCIUM CARB/MAGNESIUM 324 MG
1 TABLET ORAL
Qty: 0 | Refills: 0 | DISCHARGE

## 2022-07-23 RX ORDER — METOPROLOL TARTRATE 50 MG
1 TABLET ORAL
Qty: 0 | Refills: 0 | DISCHARGE

## 2022-07-23 RX ORDER — FERROUS SULFATE 325(65) MG
1 TABLET ORAL
Qty: 0 | Refills: 0 | DISCHARGE
Start: 2022-07-23

## 2022-07-23 RX ORDER — CHOLECALCIFEROL (VITAMIN D3) 125 MCG
1 CAPSULE ORAL
Qty: 0 | Refills: 0 | DISCHARGE

## 2022-07-23 RX ADMIN — PANTOPRAZOLE SODIUM 40 MILLIGRAM(S): 20 TABLET, DELAYED RELEASE ORAL at 06:28

## 2022-07-23 RX ADMIN — Medication 50 MICROGRAM(S): at 05:46

## 2022-07-23 RX ADMIN — Medication 40 MILLIGRAM(S): at 05:46

## 2022-07-23 RX ADMIN — AZITHROMYCIN 1000 MILLIGRAM(S): 500 TABLET, FILM COATED ORAL at 12:51

## 2022-07-23 RX ADMIN — CEFTRIAXONE 100 MILLIGRAM(S): 500 INJECTION, POWDER, FOR SOLUTION INTRAMUSCULAR; INTRAVENOUS at 05:47

## 2022-07-23 RX ADMIN — Medication 325 MILLIGRAM(S): at 12:51

## 2022-07-23 NOTE — DISCHARGE NOTE PROVIDER - HOSPITAL COURSE
ADMISSION DATE:  07-20-22    ---  FROM ADMISSION H+P:   HPI:  88 year old female with PMHx of CAD s/p stents (2002), s/p PPM (2016), Hypertension, Hyperlipidemia, Hypothyroidism, hearing loss BIBA from home c/o BRBPR that began this evening. History is supplemented by patient's daughter (Magno) at bedside. Patient presented to [facility] ED 4 days ago c/o weakness and decreased PO intake after recent COVID dx at the beginning of July. She was diagnosed with a mild UTI at that time and was discharged from the ED s/p IVF. Earlier this evening, patient presented to PMD office c/o persistent weakness and was prescribed Cefpodoxime for UTI. Patient took one dose of Cefpodoxime and subsequently felt nauseous, vomited x 2-3 and noticed BRBPR when she went to the bathroom. She denies blood in her vomit and is unable to quantify the amount of blood in her stool. She admits to diffuse achy abdominal pain. Denies fever/chills, headache, dizziness, shortness of breath, chest pain. Of note, patient's daughter states she has had bouts of colitis in the past with associated BRBPR for which she was never hospitalized.    ED course:  Vitals: T 97.2F, HR 69, BP 90/43 -> 114/55  Labs: FOBT +, WBC 18.02, PTT 25.2, Cl 113, CO2 20, Cr 1.6, gluc 134, GFR 31  CT a/p: Sigmoid diverticulosis without evidence of diverticulitis. Mild fluid distention of the colon which may be reflective of diarrhea.Moderate-sized sliding hiatus hernia.  Given: IV Protonix x1, 1L IV NS x1   (20 Jul 2022 01:28)      ---  HOSPITAL COURSE/PERTINENT LABS/PROCEDURES PERFORMED/PENDING TESTS:   Pt was admitted for GIB, UTI, BASHIR, and dehydration. Made NPO and started on IV protonix. GI Dr. Morales consulted for GIB. Bleeding improved and pt was advanced to regular diet and tolerated it well. ID Dr. Willis consulted for UTI. Started on ceftriaxone for UTI. BASHIR and dehydration improved with IV fluids. Pt originally planned to be discharged but had an elevation in WBC and monitored for 24 more hours. Pt's hospital course complicated by phlebitis and L knee pain. Dopplers obtained negative for DVT. Knee x-ray done showing effusion and possible Baker's cyst. Ortho consulted to r/o septic joint. ESR wnl, CRP elevated but per ortho, given no fever, pt was given trial of IV steroids. PT consulted, initially recommended home with home PT but due to worsening knee pain pt was re-evaluated and unsafe to go home. Pt improved with steroids and cleared by PT. Pt continued to have episodes of diarrhea. GI PCR was obtained which revealed EPEC. Given one dose of azithromycin to treat EPEC enteritis given suspected reactive arthritis.    Patient is stable for discharge as per primary medical team and consultants.    Patient showed improvement throughout hospitalization. Patient was seen and examined on day of discharge. Patient was medically optimized for discharge to complete oral antibiotic course with close outpatient follow up.

## 2022-07-23 NOTE — PROGRESS NOTE ADULT - SUBJECTIVE AND OBJECTIVE BOX
[facility]  Chandrakant Brown PA-C  [address] [place] [id number] [phone]      INTERVAL HPI/OVERNIGHT EVENTS:  Pt s/e  Reports no further overt GI bleeding symptoms  Tolerating diet  No further GI complaints  Denies having any abdominal pain , n/v/d. Reports having bowel movements no melena noted.    MEDICATIONS  (STANDING):  atorvastatin 10 milliGRAM(s) Oral at bedtime  cefTRIAXone   IVPB 1000 milliGRAM(s) IV Intermittent every 24 hours  levothyroxine 50 MICROGram(s) Oral <User Schedule>  levothyroxine 75 MICROGram(s) Oral <User Schedule>  loperamide 2 milliGRAM(s) Oral once  pantoprazole  Injectable 40 milliGRAM(s) IV Push two times a day  sodium chloride 0.9%. 1000 milliLiter(s) (75 mL/Hr) IV Continuous <Continuous>    MEDICATIONS  (PRN):  acetaminophen     Tablet .. 650 milliGRAM(s) Oral every 6 hours PRN Mild Pain (1 - 3)      Allergies  No Known Allergies      PHYSICAL EXAM:   Vital Signs:  Vital Signs Last 24 Hrs  T(C): 37.3 (2022 04:35), Max: 37.3 (2022 13:06)  T(F): 99.1 (2022 04:35), Max: 99.1 (2022 13:06)  HR: 93 (2022 04:35) (81 - 93)  BP: 134/60 (2022 04:35) (112/64 - 134/60)  BP(mean): --  RR: 18 (2022 04:35) (18 - 18)  SpO2: 95% (2022 04:35) (95% - 98%)    Parameters below as of 2022 04:35  Patient On (Oxygen Delivery Method): room air      Daily     Daily Weight in k.8 (2022 04:35)    GENERAL:  Appears stated age  ABDOMEN:  Soft, non-tender, non-distended  NEURO:  Alert    LABS:                        9.9    13.99 )-----------( 213      ( 2022 07:45 )             31.1         139  |  112<H>  |  10  ----------------------------<  85  4.2   |  17<L>  |  0.73    Ca    8.3<L>      2022 07:45    TPro  5.5<L>  /  Alb  2.6<L>  /  TBili  0.6  /  DBili  0.2  /  AST  20  /  ALT  12  /  AlkPhos  53      PT/INR - ( 2022 07:45 )   PT: 13.7 sec;   INR: 1.17 ratio            [facility]  Chandrakant Brown PA-C  [address] [place], [place] [id number] [phone]      INTERVAL HPI/OVERNIGHT EVENTS:  Pt s/e  Reports no further overt GI bleeding symptoms  Tolerating diet  No further GI complaints  Denies having any abdominal pain , n/v/d. Reports having bowel movements no melena noted.    MEDICATIONS  (STANDING):  atorvastatin 10 milliGRAM(s) Oral at bedtime  cefTRIAXone   IVPB 1000 milliGRAM(s) IV Intermittent every 24 hours  levothyroxine 50 MICROGram(s) Oral <User Schedule>  levothyroxine 75 MICROGram(s) Oral <User Schedule>  loperamide 2 milliGRAM(s) Oral once  pantoprazole  Injectable 40 milliGRAM(s) IV Push two times a day  sodium chloride 0.9%. 1000 milliLiter(s) (75 mL/Hr) IV Continuous <Continuous>    MEDICATIONS  (PRN):  acetaminophen     Tablet .. 650 milliGRAM(s) Oral every 6 hours PRN Mild Pain (1 - 3)      Allergies  No Known Allergies      PHYSICAL EXAM:   Vital Signs Last 24 Hrs  T(C): 36.6 (2022 05:24), Max: 36.8 (2022 20:37)  T(F): 97.9 (2022 05:24), Max: 98.3 (2022 20:37)  HR: 88 (2022 05:24) (74 - 88)  BP: 135/69 (2022 05:24) (116/65 - 142/62)  BP(mean): --  RR: 18 (2022 05:24) (16 - 18)  SpO2: 95% (2022 05:24) (94% - 95%)    Parameters below as of 2022 05:24  Patient On (Oxygen Delivery Method): room air    Daily     Daily Weight in k.8 (2022 04:35)    GENERAL:  Appears stated age  ABDOMEN:  Soft, non-tender, non-distended  NEURO:  Alert                          10.7   14.38 )-----------( 220      ( 2022 08:53 )             33.2           139  |  112<H>  |  10  ----------------------------<  85  4.2   |  17<L>  |  0.73    Ca    8.3<L>      2022 07:45    TPro  5.5<L>  /  Alb  2.6<L>  /  TBili  0.6  /  DBili  0.2  /  AST  20  /  ALT  12  /  AlkPhos  53      PT/INR - ( 2022 07:45 )   PT: 13.7 sec;   INR: 1.17 ratio

## 2022-07-23 NOTE — DISCHARGE NOTE PROVIDER - CARE PROVIDER_API CALL
Pedro Arroyo (DO)  Internal Medicine  [address], [place] [address]  Phone: [phone]  Fax: [phone]  Established Patient  Follow Up Time: 1-3 days

## 2022-07-23 NOTE — PROGRESS NOTE ADULT - ASSESSMENT
88 year old female with PMHx of CAD s/p stents (2002), s/p PPM (2016), Hypertension, Hyperlipidemia, Hypothyroidism, hearing loss BIBA from home c/o BRBPR that began this evening. History is supplemented by patient's daughter (Magno) at bedside. Patient presented to [facility] ED 4 days ago c/o weakness and decreased PO intake after recent COVID dx early July after attending a gathering with people with COVID. She was diagnosed with a mild UTI at that time and was discharged from the ED s/p IVF.     RECOMMENDATIONS  1-COVID - clarified from daughter and on review of [facility] hospital visit looks like positive COVID dated as 7/7 and treated with Paxlovid so not in first week of illness so past acute phase and would agree with no antiviral at this point-supportive care only and as past second week very low risk of decompensation   based on history no isolation required    2-UTI/Pyelonephritis - with leukocytosis, systemic symptoms, urine culture from [facility] collection w Proteus Mirabilis relatively pansensitive only resistant to macrobid, noted UA from 7/15 with mod LE, pos nitrates and pyuria, not seeing micro data so agree with ceftriaxone for now   pt interested in dc to home so if fine for other issues, nl wbc, afebrile and improved so fine to look at dc onKeflex 500mg PO QID with last day 7/29    3-knee issue - noted swelling in prepatellar region and not concerning for DVT, defer to medicine, not seemingly infectious or COVID related suggest this may be noninfectious prepatellar bursitis    7/23 - GI PCR+ EPEC. Given pt w/ above and ?reactice arthritis, will treat underlying cause. Given azithromycin 1gm x dose     Stable from ID standpoint  D/c planning per primary team    D/w Dr. Haro    Infectious Diseases will continue to follow. Please call with any questions.   Gretchen Eckert M.D.  [facility], Division of Infectious Diseases [phone]

## 2022-07-23 NOTE — DISCHARGE NOTE PROVIDER - NSDCFUADDINST_GEN_ALL_CORE_FT
HOLD aspirin, START naproxen, do not take both at the same time (follow up with cardiology and PCP to resume aspirin)

## 2022-07-23 NOTE — PROGRESS NOTE ADULT - SUBJECTIVE AND OBJECTIVE BOX
[facility], Division of Infectious Diseases  VANESSA Newby Y. Patel, S. Shah, G. [facility]  [phone]    Name: MATI GUNN  Age: 88y  Gender: Female  MRN: [id number]    Interval History:  Patient seen and examined at bedside  No acute overnight events. Afebrile  GI PCR +EPEC  Pt reporting 1 episode of slight diarrhea today overall better  niece at bedside  Notes reviewed    Antibiotics:  cefTRIAXone   IVPB 1000 milliGRAM(s) IV Intermittent every 24 hours      Medications:  acetaminophen     Tablet .. 650 milliGRAM(s) Oral every 6 hours PRN  atorvastatin 10 milliGRAM(s) Oral at bedtime  cefTRIAXone   IVPB 1000 milliGRAM(s) IV Intermittent every 24 hours  ferrous    sulfate 325 milliGRAM(s) Oral daily  levothyroxine 75 MICROGram(s) Oral <User Schedule>  levothyroxine 50 MICROGram(s) Oral <User Schedule>  loperamide 2 milliGRAM(s) Oral once  melatonin 3 milliGRAM(s) Oral at bedtime PRN  pantoprazole    Tablet 40 milliGRAM(s) Oral before breakfast  sodium chloride 0.9%. 1000 milliLiter(s) IV Continuous <Continuous>      Review of Systems:  A 10-point review of systems was obtained.     Pertinent positives and negatives--  Constitutional: No fevers. No Chills. No Rigors.   Cardiovascular: No chest pain. No palpitations.  Respiratory: No shortness of breath. No cough.  Gastrointestinal: No nausea or vomiting. No diarrhea or constipation.   Psychiatric: Pleasant. Appropriate affect.    Review of systems otherwise negative except as previously noted.    Allergies: No Known Allergies    For details regarding the patient's past medical history, social history, family history, and other miscellaneous elements, please refer the initial infectious diseases consultation and/or the admitting history and physical examination for this admission.    Objective:  Vitals:   T(C): 36.5 (07-23-22 @ 12:21), Max: 36.8 (07-22-22 @ 20:37)  HR: 90 (07-23-22 @ 12:21) (74 - 90)  BP: 119/72 (07-23-22 @ 12:21) (116/65 - 142/62)  RR: 17 (07-23-22 @ 12:21) (17 - 18)  SpO2: 97% (07-23-22 @ 12:21) (94% - 97%)    Physical Examination:  General: no acute distress  HEENT: NC/AT, EOMI  Cardio: RRR  Resp: no use resp acc muscles  Abd: soft, NT, ND  Ext: no edema or cyanosis  Skin: warm, dry, no visible rash      Laboratory Studies:  CBC:                       10.7   14.38 )-----------( 220      ( 23 Jul 2022 08:53 )             33.2     CMP: 07-23    138  |  110<H>  |  10  ----------------------------<  177<H>  3.7   |  17<L>  |  0.61    Ca    8.7      23 Jul 2022 08:53    TPro  5.9<L>  /  Alb  2.7<L>  /  TBili  0.6  /  DBili  0.2  /  AST  15  /  ALT  10<L>  /  AlkPhos  61  07-23    LIVER FUNCTIONS - ( 23 Jul 2022 08:53 )  Alb: 2.7 g/dL / Pro: 5.9 g/dL / ALK PHOS: 61 U/L / ALT: 10 U/L / AST: 15 U/L / GGT: x               Microbiology: reviewed    GI PCR Panel, Stool (collected 07-21-22 @ 18:24)  Source: .Stool Feces  Final Report (07-22-22 @ 17:06):    Enteropathogenic E. coli (EPEC)    DETECTED by PCR    *******Please Note:*******    GI panel PCR evaluates for:    Campylobacter, Plesiomonas shigelloides, Salmonella,    Vibrio, Yersinia enterocolitica, Enteroaggregative    Escherichia coli (EAEC), Enteropathogenic E.coli (EPEC),    Enterotoxigenic E. coli (ETEC) lt/st, Shiga-like    toxin-producing E. coli (STEC) stx1/stx2,    Shigella/ Enteroinvasive E. coli (EIEC), Cryptosporidium,    Cyclospora cayetanensis, Entamoeba histolytica,    Giardia lamblia, AdenovirusF 40/41, Astrovirus,    Norovirus GI/GII, Rotavirus A, Sapovirus    Culture - Blood (collected 07-20-22 @ 05:10)  Source: .Blood Blood-Peripheral  Preliminary Report (07-21-22 @ 10:01):    No growth to date.    Culture - Blood (collected 07-20-22 @ 05:10)  Source: .Blood Blood-Peripheral  Preliminary Report (07-21-22 @ 10:01):    No growth to date.        Radiology: reviewed

## 2022-07-23 NOTE — DISCHARGE NOTE PROVIDER - NSDCMRMEDTOKEN_GEN_ALL_CORE_FT
cephalexin 500 mg oral tablet: 1 tab(s) orally 4 times a day   ferrous sulfate 325 mg (65 mg elemental iron) oral tablet: 1 tab(s) orally once a day  Home PT: Home Physical Therapy  2-3x/week as needed pending patient&#x27;s progress  ICD 10: R26.2  levothyroxine 50 mcg (0.05 mg) oral tablet: 1 tab(s) orally every other day  levothyroxine 75 mcg (0.075 mg) oral tablet: 1 tab(s) orally every other day  lovastatin 40 mg oral tablet: 1 tab(s) orally once a day  naproxen 500 mg oral tablet: 1 tab(s) orally 2 times a day   PriLOSEC 20 mg oral delayed release capsule: 1 cap(s) orally once a day

## 2022-07-23 NOTE — DISCHARGE NOTE PROVIDER - NSDCCPCAREPLAN_GEN_ALL_CORE_FT
PRINCIPAL DISCHARGE DIAGNOSIS  Diagnosis: Enteritis  Assessment and Plan of Treatment: You were admitted with dehydration from enteritis. Your GI stool PCR was positive for E. coli (EPEC). You were given a dose of azithromycin.      SECONDARY DISCHARGE DIAGNOSES  Diagnosis: Hypotension  Assessment and Plan of Treatment: Your blood pressure was low on admission. Your home blood pressure medications were stopped. Your blood pressure remained well controlled during your hospitalization. Please STOP taking your home metoprolol, losartan, and amlodipine. Follow up with your PCP within 1 week for BP check.    Diagnosis: GI bleed  Assessment and Plan of Treatment: Your FOBT was positive. Your home baby aspirin was held. Your hemoglobin remained stable and you did not require any blood transfusions. Please HOLD baby aspirin for the next 3 days and follow up with your cardiologist to determine when to resume baby aspirin.    Diagnosis: UTI (urinary tract infection)  Assessment and Plan of Treatment: You were diagnosed with a urinary tract infection and started on IV antibiotics. Please CONTINUE Keflex four times daily for 6 more days to finish on 07/29/2022.    Diagnosis: Phlebitis  Assessment and Plan of Treatment: You had superficial thrombophlebitis on your left hand from your IV site. Please continue warm compresses as needed.    Diagnosis: Reactive arthritis  Assessment and Plan of Treatment: You developed left knee pain during your hospitalization likely due to reactive arthritis from the enteritis. You were seen by the orthopedic team and were treated with IV steroids with improvement. Please START Naproxen 500 mg twice daily for 3 more days. DO NOT take any NSAIDs or aspirin while taking Naproxen. Follow up with your PCP if your pain returns or if it worsens.    Diagnosis: BASHIR (acute kidney injury)  Assessment and Plan of Treatment: You were admitted with acute kidney injury from dehydration. You were given IV fluids with improvement. Please stay well hydrated!!!     PRINCIPAL DISCHARGE DIAGNOSIS  Diagnosis: Enteritis  Assessment and Plan of Treatment: You were admitted with dehydration from enteritis. Your GI stool PCR was positive for E. coli (EPEC). You were given a dose of azithromycin.      SECONDARY DISCHARGE DIAGNOSES  Diagnosis: Hypotension  Assessment and Plan of Treatment: Your blood pressure was low on admission. Your home blood pressure medications were stopped. Your blood pressure remained well controlled during your hospitalization. Please STOP taking your home metoprolol, losartan, and amlodipine. Follow up with your PCP within 1 week for BP check.    Diagnosis: GI bleed  Assessment and Plan of Treatment: Your FOBT was positive. Your home baby aspirin was held. Your hemoglobin remained stable and you did not require any blood transfusions. Please HOLD baby aspirin for the next 3 days and follow up with your cardiologist to determine when to resume baby aspirin. You had iron studies which revealed mild iron deficiency. Please supplement with over the counter iron pills.    Diagnosis: UTI (urinary tract infection)  Assessment and Plan of Treatment: You were diagnosed with a urinary tract infection and started on IV antibiotics. Please CONTINUE Keflex four times daily for 6 more days to finish on 07/29/2022.    Diagnosis: Phlebitis  Assessment and Plan of Treatment: You had superficial thrombophlebitis on your left hand from your IV site. Please continue warm compresses as needed.    Diagnosis: Reactive arthritis  Assessment and Plan of Treatment: You developed left knee pain during your hospitalization likely due to reactive arthritis from the enteritis. You were seen by the orthopedic team and were treated with IV steroids with improvement. Please START Naproxen 500 mg twice daily for 3 more days. DO NOT take any NSAIDs or aspirin while taking Naproxen. Follow up with your PCP if your pain returns or if it worsens.    Diagnosis: BASHIR (acute kidney injury)  Assessment and Plan of Treatment: You were admitted with acute kidney injury from dehydration. You were given IV fluids with improvement. Please stay well hydrated!!!

## 2022-07-23 NOTE — DISCHARGE NOTE PROVIDER - ATTENDING DISCHARGE PHYSICAL EXAMINATION:
T(C): 36.5 (07-23-22 @ 12:21), Max: 36.8 (07-22-22 @ 20:37)  HR: 90 (07-23-22 @ 12:21) (74 - 90)  BP: 119/72 (07-23-22 @ 12:21) (116/65 - 142/62)  RR: 17 (07-23-22 @ 12:21) (17 - 18)  SpO2: 97% (07-23-22 @ 12:21) (94% - 97%)    GENERAL: NAD, elderly  HEENT:  anicteric, moist mucous membranes  CHEST/LUNG:  CTA b/l, no rales, wheezes, or rhonchi  HEART:  RRR, S1, S2  ABDOMEN:  BS+, soft, nontender, nondistended  EXTREMITIES: L knee swelling improved, L hand swelling and erythema improved  NERVOUS SYSTEM: answers questions and follows commands appropriately

## 2022-07-23 NOTE — PROGRESS NOTE ADULT - PROVIDER SPECIALTY LIST ADULT
Infectious Disease
Gastroenterology
Gastroenterology
Infectious Disease
Gastroenterology
Infectious Disease
Infectious Disease
Hospitalist
Hospitalist

## 2022-07-24 LAB
CULTURE RESULTS: SIGNIFICANT CHANGE UP
SPECIMEN SOURCE: SIGNIFICANT CHANGE UP

## 2022-07-25 LAB
CULTURE RESULTS: SIGNIFICANT CHANGE UP
CULTURE RESULTS: SIGNIFICANT CHANGE UP
SPECIMEN SOURCE: SIGNIFICANT CHANGE UP
SPECIMEN SOURCE: SIGNIFICANT CHANGE UP

## 2022-09-02 NOTE — ED ADULT TRIAGE NOTE - HEIGHT IN INCHES
Inpatient device interrogation and/or reprogramming orders received; the industry representative was contacted and provided with patient's name and room number.         
11

## 2022-11-01 ENCOUNTER — APPOINTMENT (OUTPATIENT)
Dept: OBGYN | Facility: CLINIC | Age: 87
End: 2022-11-01

## 2022-11-01 VITALS — WEIGHT: 124 LBS | HEIGHT: 59 IN | BODY MASS INDEX: 25 KG/M2

## 2022-11-01 DIAGNOSIS — N95.2 POSTMENOPAUSAL ATROPHIC VAGINITIS: ICD-10-CM

## 2022-11-01 DIAGNOSIS — Z46.89 ENCOUNTER FOR FITTING AND ADJUSTMENT OF OTHER SPECIFIED DEVICES: ICD-10-CM

## 2022-11-01 DIAGNOSIS — R35.0 FREQUENCY OF MICTURITION: ICD-10-CM

## 2022-11-01 PROBLEM — I25.10 ATHEROSCLEROTIC HEART DISEASE OF NATIVE CORONARY ARTERY WITHOUT ANGINA PECTORIS: Chronic | Status: ACTIVE | Noted: 2018-09-04

## 2022-11-01 PROCEDURE — 99214 OFFICE O/P EST MOD 30 MIN: CPT

## 2022-11-01 RX ORDER — METOPROLOL SUCCINATE 50 MG/1
50 TABLET, EXTENDED RELEASE ORAL
Qty: 90 | Refills: 0 | Status: ACTIVE | COMMUNITY
Start: 2022-05-23

## 2022-11-01 RX ORDER — LOSARTAN POTASSIUM 100 MG/1
100 TABLET, FILM COATED ORAL
Qty: 90 | Refills: 0 | Status: ACTIVE | COMMUNITY
Start: 2022-09-09

## 2022-11-01 RX ORDER — ESTRADIOL 0.1 MG/G
0.1 CREAM VAGINAL
Qty: 1 | Refills: 6 | Status: COMPLETED | COMMUNITY
Start: 2017-07-07 | End: 2022-11-01

## 2022-11-01 RX ORDER — CEFPODOXIME PROXETIL 100 MG/1
100 TABLET, FILM COATED ORAL
Qty: 14 | Refills: 0 | Status: COMPLETED | COMMUNITY
Start: 2022-07-19

## 2022-11-01 RX ORDER — NITROFURANTOIN (MONOHYDRATE/MACROCRYSTALS) 25; 75 MG/1; MG/1
100 CAPSULE ORAL
Qty: 14 | Refills: 2 | Status: COMPLETED | COMMUNITY
Start: 2020-07-24 | End: 2022-11-01

## 2022-11-01 RX ORDER — NIRMATRELVIR AND RITONAVIR 150-100 MG
10 X 150 MG & KIT ORAL
Qty: 20 | Refills: 0 | Status: COMPLETED | COMMUNITY
Start: 2022-07-08

## 2022-11-01 RX ORDER — CEPHALEXIN 500 MG/1
500 CAPSULE ORAL
Qty: 24 | Refills: 0 | Status: COMPLETED | COMMUNITY
Start: 2022-07-23

## 2022-11-01 RX ORDER — NAPROXEN 500 MG/1
500 TABLET ORAL
Qty: 6 | Refills: 0 | Status: ACTIVE | COMMUNITY
Start: 2022-07-23

## 2022-11-01 RX ORDER — BUSPIRONE HYDROCHLORIDE 5 MG/1
5 TABLET ORAL
Qty: 60 | Refills: 0 | Status: ACTIVE | COMMUNITY
Start: 2022-10-26

## 2022-11-01 RX ORDER — LOSARTAN POTASSIUM 50 MG/1
50 TABLET, FILM COATED ORAL
Qty: 90 | Refills: 0 | Status: ACTIVE | COMMUNITY
Start: 2022-08-11

## 2022-11-01 NOTE — PHYSICAL EXAM
[Vulvar Atrophy] : vulvar atrophy [Labia Majora] : normal [Labia Minora] : normal [Atrophy] : atrophy [Cystocele] : a cystocele [Normal] : normal [Uterine Adnexae] : normal [FreeTextEntry4] : there is some posterior wall weakness but not severe

## 2022-12-23 ENCOUNTER — RX RENEWAL (OUTPATIENT)
Age: 87
End: 2022-12-23

## 2022-12-23 RX ORDER — ESTRADIOL 1 MG/1
1 TABLET ORAL DAILY
Qty: 30 | Refills: 1 | Status: ACTIVE | COMMUNITY
Start: 2022-11-01 | End: 1900-01-01

## 2023-08-20 ENCOUNTER — INPATIENT (INPATIENT)
Facility: HOSPITAL | Age: 88
LOS: 1 days | Discharge: HOME | DRG: 312 | End: 2023-08-22
Attending: HOSPITALIST | Admitting: HOSPITALIST
Payer: MEDICARE

## 2023-08-20 VITALS
RESPIRATION RATE: 16 BRPM | HEART RATE: 71 BPM | WEIGHT: 119.05 LBS | OXYGEN SATURATION: 98 % | TEMPERATURE: 98 F | DIASTOLIC BLOOD PRESSURE: 70 MMHG | HEIGHT: 59 IN | SYSTOLIC BLOOD PRESSURE: 194 MMHG

## 2023-08-20 DIAGNOSIS — R55 SYNCOPE AND COLLAPSE: ICD-10-CM

## 2023-08-20 LAB
ALBUMIN SERPL ELPH-MCNC: 3.4 G/DL — SIGNIFICANT CHANGE UP (ref 3.3–5)
ALP SERPL-CCNC: 69 U/L — SIGNIFICANT CHANGE UP (ref 40–120)
ALT FLD-CCNC: 15 U/L — SIGNIFICANT CHANGE UP (ref 12–78)
ANION GAP SERPL CALC-SCNC: 6 MMOL/L — SIGNIFICANT CHANGE UP (ref 5–17)
APPEARANCE UR: CLEAR — SIGNIFICANT CHANGE UP
APTT BLD: 26.9 SEC — SIGNIFICANT CHANGE UP (ref 24.5–35.6)
AST SERPL-CCNC: 19 U/L — SIGNIFICANT CHANGE UP (ref 15–37)
BASOPHILS # BLD AUTO: 0.04 K/UL — SIGNIFICANT CHANGE UP (ref 0–0.2)
BASOPHILS NFR BLD AUTO: 0.5 % — SIGNIFICANT CHANGE UP (ref 0–2)
BILIRUB SERPL-MCNC: 0.5 MG/DL — SIGNIFICANT CHANGE UP (ref 0.2–1.2)
BILIRUB UR-MCNC: NEGATIVE — SIGNIFICANT CHANGE UP
BUN SERPL-MCNC: 11 MG/DL — SIGNIFICANT CHANGE UP (ref 7–23)
CALCIUM SERPL-MCNC: 9 MG/DL — SIGNIFICANT CHANGE UP (ref 8.5–10.1)
CHLORIDE SERPL-SCNC: 100 MMOL/L — SIGNIFICANT CHANGE UP (ref 96–108)
CO2 SERPL-SCNC: 23 MMOL/L — SIGNIFICANT CHANGE UP (ref 22–31)
COLOR SPEC: YELLOW — SIGNIFICANT CHANGE UP
CREAT ?TM UR-MCNC: 32 MG/DL — SIGNIFICANT CHANGE UP
CREAT SERPL-MCNC: 0.83 MG/DL — SIGNIFICANT CHANGE UP (ref 0.5–1.3)
DIFF PNL FLD: NEGATIVE — SIGNIFICANT CHANGE UP
EGFR: 67 ML/MIN/1.73M2 — SIGNIFICANT CHANGE UP
EOSINOPHIL # BLD AUTO: 0.2 K/UL — SIGNIFICANT CHANGE UP (ref 0–0.5)
EOSINOPHIL NFR BLD AUTO: 2.5 % — SIGNIFICANT CHANGE UP (ref 0–6)
GLUCOSE SERPL-MCNC: 97 MG/DL — SIGNIFICANT CHANGE UP (ref 70–99)
GLUCOSE UR QL: NEGATIVE MG/DL — SIGNIFICANT CHANGE UP
HCT VFR BLD CALC: 37.9 % — SIGNIFICANT CHANGE UP (ref 34.5–45)
HGB BLD-MCNC: 13 G/DL — SIGNIFICANT CHANGE UP (ref 11.5–15.5)
IMM GRANULOCYTES NFR BLD AUTO: 0.4 % — SIGNIFICANT CHANGE UP (ref 0–0.9)
INR BLD: 0.91 RATIO — SIGNIFICANT CHANGE UP (ref 0.85–1.18)
KETONES UR-MCNC: NEGATIVE MG/DL — SIGNIFICANT CHANGE UP
LEUKOCYTE ESTERASE UR-ACNC: ABNORMAL
LIDOCAIN IGE QN: 201 U/L — SIGNIFICANT CHANGE UP (ref 73–393)
LYMPHOCYTES # BLD AUTO: 1.99 K/UL — SIGNIFICANT CHANGE UP (ref 1–3.3)
LYMPHOCYTES # BLD AUTO: 24.5 % — SIGNIFICANT CHANGE UP (ref 13–44)
MCHC RBC-ENTMCNC: 30.7 PG — SIGNIFICANT CHANGE UP (ref 27–34)
MCHC RBC-ENTMCNC: 34.3 GM/DL — SIGNIFICANT CHANGE UP (ref 32–36)
MCV RBC AUTO: 89.4 FL — SIGNIFICANT CHANGE UP (ref 80–100)
MONOCYTES # BLD AUTO: 0.84 K/UL — SIGNIFICANT CHANGE UP (ref 0–0.9)
MONOCYTES NFR BLD AUTO: 10.4 % — SIGNIFICANT CHANGE UP (ref 2–14)
NEUTROPHILS # BLD AUTO: 5.01 K/UL — SIGNIFICANT CHANGE UP (ref 1.8–7.4)
NEUTROPHILS NFR BLD AUTO: 61.7 % — SIGNIFICANT CHANGE UP (ref 43–77)
NITRITE UR-MCNC: NEGATIVE — SIGNIFICANT CHANGE UP
NRBC # BLD: 0 /100 WBCS — SIGNIFICANT CHANGE UP (ref 0–0)
OSMOLALITY UR: 187 MOSM/KG — SIGNIFICANT CHANGE UP (ref 50–1200)
PH UR: 6 — SIGNIFICANT CHANGE UP (ref 5–8)
PLATELET # BLD AUTO: 204 K/UL — SIGNIFICANT CHANGE UP (ref 150–400)
POTASSIUM SERPL-MCNC: 4.2 MMOL/L — SIGNIFICANT CHANGE UP (ref 3.5–5.3)
POTASSIUM SERPL-SCNC: 4.2 MMOL/L — SIGNIFICANT CHANGE UP (ref 3.5–5.3)
PROT SERPL-MCNC: 6.5 G/DL — SIGNIFICANT CHANGE UP (ref 6–8.3)
PROT UR-MCNC: NEGATIVE MG/DL — SIGNIFICANT CHANGE UP
PROTHROM AB SERPL-ACNC: 10.7 SEC — SIGNIFICANT CHANGE UP (ref 9.5–13)
RBC # BLD: 4.24 M/UL — SIGNIFICANT CHANGE UP (ref 3.8–5.2)
RBC # FLD: 12.5 % — SIGNIFICANT CHANGE UP (ref 10.3–14.5)
SODIUM SERPL-SCNC: 129 MMOL/L — LOW (ref 135–145)
SODIUM UR-SCNC: 40 MMOL/L — SIGNIFICANT CHANGE UP
SP GR SPEC: 1.01 — SIGNIFICANT CHANGE UP (ref 1–1.03)
TROPONIN I, HIGH SENSITIVITY RESULT: 11 NG/L — SIGNIFICANT CHANGE UP
UROBILINOGEN FLD QL: 0.2 MG/DL — SIGNIFICANT CHANGE UP (ref 0.2–1)
WBC # BLD: 8.11 K/UL — SIGNIFICANT CHANGE UP (ref 3.8–10.5)
WBC # FLD AUTO: 8.11 K/UL — SIGNIFICANT CHANGE UP (ref 3.8–10.5)

## 2023-08-20 PROCEDURE — 70450 CT HEAD/BRAIN W/O DYE: CPT | Mod: 26,MA

## 2023-08-20 PROCEDURE — 93010 ELECTROCARDIOGRAM REPORT: CPT

## 2023-08-20 PROCEDURE — 99285 EMERGENCY DEPT VISIT HI MDM: CPT

## 2023-08-20 RX ORDER — LANOLIN ALCOHOL/MO/W.PET/CERES
3 CREAM (GRAM) TOPICAL AT BEDTIME
Refills: 0 | Status: DISCONTINUED | OUTPATIENT
Start: 2023-08-20 | End: 2023-08-22

## 2023-08-20 RX ORDER — ONDANSETRON 8 MG/1
4 TABLET, FILM COATED ORAL EVERY 8 HOURS
Refills: 0 | Status: DISCONTINUED | OUTPATIENT
Start: 2023-08-20 | End: 2023-08-22

## 2023-08-20 RX ORDER — LOSARTAN POTASSIUM 100 MG/1
1 TABLET, FILM COATED ORAL
Refills: 0 | DISCHARGE

## 2023-08-20 RX ORDER — AMLODIPINE BESYLATE 2.5 MG/1
2.5 TABLET ORAL AT BEDTIME
Refills: 0 | Status: DISCONTINUED | OUTPATIENT
Start: 2023-08-20 | End: 2023-08-22

## 2023-08-20 RX ORDER — METOPROLOL TARTRATE 50 MG
1 TABLET ORAL
Refills: 0 | DISCHARGE

## 2023-08-20 RX ORDER — ASPIRIN/CALCIUM CARB/MAGNESIUM 324 MG
1 TABLET ORAL
Refills: 0 | DISCHARGE

## 2023-08-20 RX ORDER — AMLODIPINE BESYLATE 2.5 MG/1
1 TABLET ORAL
Refills: 0 | DISCHARGE

## 2023-08-20 RX ORDER — LEVOTHYROXINE SODIUM 125 MCG
75 TABLET ORAL
Refills: 0 | Status: DISCONTINUED | OUTPATIENT
Start: 2023-08-21 | End: 2023-08-22

## 2023-08-20 RX ORDER — AMLODIPINE BESYLATE 2.5 MG/1
2.5 TABLET ORAL AT BEDTIME
Refills: 0 | Status: DISCONTINUED | OUTPATIENT
Start: 2023-08-20 | End: 2023-08-20

## 2023-08-20 RX ORDER — OMEPRAZOLE 10 MG/1
1 CAPSULE, DELAYED RELEASE ORAL
Qty: 0 | Refills: 0 | DISCHARGE

## 2023-08-20 RX ORDER — ASPIRIN/CALCIUM CARB/MAGNESIUM 324 MG
81 TABLET ORAL DAILY
Refills: 0 | Status: DISCONTINUED | OUTPATIENT
Start: 2023-08-20 | End: 2023-08-22

## 2023-08-20 RX ORDER — METOPROLOL TARTRATE 50 MG
25 TABLET ORAL DAILY
Refills: 0 | Status: DISCONTINUED | OUTPATIENT
Start: 2023-08-20 | End: 2023-08-20

## 2023-08-20 RX ORDER — ENOXAPARIN SODIUM 100 MG/ML
40 INJECTION SUBCUTANEOUS EVERY 24 HOURS
Refills: 0 | Status: DISCONTINUED | OUTPATIENT
Start: 2023-08-20 | End: 2023-08-22

## 2023-08-20 RX ORDER — ACETAMINOPHEN 500 MG
650 TABLET ORAL EVERY 6 HOURS
Refills: 0 | Status: DISCONTINUED | OUTPATIENT
Start: 2023-08-20 | End: 2023-08-22

## 2023-08-20 RX ORDER — LOSARTAN POTASSIUM 100 MG/1
100 TABLET, FILM COATED ORAL DAILY
Refills: 0 | Status: DISCONTINUED | OUTPATIENT
Start: 2023-08-20 | End: 2023-08-20

## 2023-08-20 RX ORDER — SODIUM CHLORIDE 9 MG/ML
1000 INJECTION INTRAMUSCULAR; INTRAVENOUS; SUBCUTANEOUS ONCE
Refills: 0 | Status: COMPLETED | OUTPATIENT
Start: 2023-08-20 | End: 2023-08-20

## 2023-08-20 RX ORDER — LEVOTHYROXINE SODIUM 125 MCG
50 TABLET ORAL
Refills: 0 | Status: DISCONTINUED | OUTPATIENT
Start: 2023-08-20 | End: 2023-08-22

## 2023-08-20 RX ORDER — CEFTRIAXONE 500 MG/1
1000 INJECTION, POWDER, FOR SOLUTION INTRAMUSCULAR; INTRAVENOUS EVERY 24 HOURS
Refills: 0 | Status: DISCONTINUED | OUTPATIENT
Start: 2023-08-20 | End: 2023-08-21

## 2023-08-20 RX ORDER — METOPROLOL TARTRATE 50 MG
25 TABLET ORAL DAILY
Refills: 0 | Status: DISCONTINUED | OUTPATIENT
Start: 2023-08-20 | End: 2023-08-22

## 2023-08-20 RX ORDER — LEVOTHYROXINE SODIUM 125 MCG
1 TABLET ORAL
Qty: 0 | Refills: 0 | DISCHARGE

## 2023-08-20 RX ORDER — ATORVASTATIN CALCIUM 80 MG/1
20 TABLET, FILM COATED ORAL AT BEDTIME
Refills: 0 | Status: DISCONTINUED | OUTPATIENT
Start: 2023-08-20 | End: 2023-08-20

## 2023-08-20 RX ORDER — LOVASTATIN 20 MG
1 TABLET ORAL
Qty: 0 | Refills: 0 | DISCHARGE

## 2023-08-20 RX ORDER — ATORVASTATIN CALCIUM 80 MG/1
10 TABLET, FILM COATED ORAL AT BEDTIME
Refills: 0 | Status: DISCONTINUED | OUTPATIENT
Start: 2023-08-20 | End: 2023-08-20

## 2023-08-20 RX ORDER — LOSARTAN POTASSIUM 100 MG/1
100 TABLET, FILM COATED ORAL DAILY
Refills: 0 | Status: DISCONTINUED | OUTPATIENT
Start: 2023-08-20 | End: 2023-08-22

## 2023-08-20 RX ORDER — ATORVASTATIN CALCIUM 80 MG/1
20 TABLET, FILM COATED ORAL AT BEDTIME
Refills: 0 | Status: DISCONTINUED | OUTPATIENT
Start: 2023-08-20 | End: 2023-08-22

## 2023-08-20 RX ADMIN — ATORVASTATIN CALCIUM 20 MILLIGRAM(S): 80 TABLET, FILM COATED ORAL at 21:08

## 2023-08-20 RX ADMIN — AMLODIPINE BESYLATE 2.5 MILLIGRAM(S): 2.5 TABLET ORAL at 21:08

## 2023-08-20 RX ADMIN — SODIUM CHLORIDE 1000 MILLILITER(S): 9 INJECTION INTRAMUSCULAR; INTRAVENOUS; SUBCUTANEOUS at 02:20

## 2023-08-20 RX ADMIN — LOSARTAN POTASSIUM 100 MILLIGRAM(S): 100 TABLET, FILM COATED ORAL at 10:14

## 2023-08-20 RX ADMIN — Medication 81 MILLIGRAM(S): at 11:01

## 2023-08-20 RX ADMIN — ONDANSETRON 4 MILLIGRAM(S): 8 TABLET, FILM COATED ORAL at 22:38

## 2023-08-20 RX ADMIN — Medication 650 MILLIGRAM(S): at 21:44

## 2023-08-20 RX ADMIN — Medication 25 MILLIGRAM(S): at 10:14

## 2023-08-20 RX ADMIN — Medication 650 MILLIGRAM(S): at 22:38

## 2023-08-20 RX ADMIN — ENOXAPARIN SODIUM 40 MILLIGRAM(S): 100 INJECTION SUBCUTANEOUS at 18:03

## 2023-08-20 RX ADMIN — CEFTRIAXONE 100 MILLIGRAM(S): 500 INJECTION, POWDER, FOR SOLUTION INTRAMUSCULAR; INTRAVENOUS at 15:47

## 2023-08-20 NOTE — ED ADULT NURSE NOTE - OBJECTIVE STATEMENT
assumed patient care at 0710 am, previous assessment not completed by night shift rn. patient being admitted to hospital for dizziness x1 week with syncope. please see md Mancia note for further details.

## 2023-08-20 NOTE — ED ADULT NURSE REASSESSMENT NOTE - NS ED NURSE REASSESS COMMENT FT1
Patient ambulated to bathroom with RN with fairly steady gait, holding RNs hand, reports dizziness but able to walk there and back.
patient off unit to echo in stretcher with transport.
Assumed patient care, patient AOx3, denies complaints, denies dizziness or pain. Patient to be admitted to the hospital for further evaluation of dizziness and sycnope. patient remains on cardiac monitor, daughter @ bedside.

## 2023-08-20 NOTE — H&P ADULT - NSHPPHYSICALEXAM_GEN_ALL_CORE
PHYSICAL EXAM:  GENERAL: NAD,   EYES: conjunctiva and sclera clear  ENMT: Moist mucous membranes  NECK: Supple, No JVD, Normal thyroid  CHEST/LUNG: non labored, cta b/l  HEART: Regular rate and rhythm; No murmurs, rubs, or gallops  ABDOMEN: Soft, Nontender, Nondistended; Bowel sounds present  EXTREMITIES:  2+ Peripheral Pulses, No clubbing, cyanosis, or edema  LYMPH: No lymphadenopathy noted  SKIN: No rashes or lesions

## 2023-08-20 NOTE — ED ADULT TRIAGE NOTE - CHIEF COMPLAINT QUOTE
pt reports dizziness for the past couple of days, fell tonight, denies head strike, fell onto buttock, no injuries reported.

## 2023-08-20 NOTE — ED PROVIDER NOTE - CLINICAL SUMMARY MEDICAL DECISION MAKING FREE TEXT BOX
89-year-old history of hypertension high cholesterol CAD with stent pacemaker here with daughter at bedside complaining about dizziness for 1 week tonight was walking to the bathroom and syncopized landed on her buttock denies any head injury.  States that dizziness is more like lightheadedness.  Denies any headache nausea vomiting.  Denies any chest pain shortness of breath.  Was able to get up with assistance after the fall and able to ambulate after.    r/o central cause, acs, labs, CT head, ekg, IV fluids

## 2023-08-20 NOTE — ED PROVIDER NOTE - OBJECTIVE STATEMENT
89-year-old history of hypertension high cholesterol CAD with stent pacemaker here with daughter at bedside complaining about dizziness for 1 week tonight was walking to the bathroom and syncopized landed on her buttock denies any head injury.  States that dizziness is more like lightheadedness.  Denies any headache nausea vomiting.  Denies any chest pain shortness of breath.  Was able to get up with assistance after the fall and able to ambulate after.

## 2023-08-20 NOTE — H&P ADULT - HISTORY OF PRESENT ILLNESS
89F with hx of hypothyroid HTN, HLD, CAD, PPM p/w fall.  Pt states she felt dizzy and lost her balance. She is unclear if she blacked out or not. Family reports she has been dizzy for a few days. Pt reports not feeling ill otherwise. does note frequent nocturia

## 2023-08-20 NOTE — PATIENT PROFILE ADULT - FALL HARM RISK - HARM RISK INTERVENTIONS
Assistance with ambulation/Assistance OOB with selected safe patient handling equipment/Communicate Risk of Fall with Harm to all staff/Discuss with provider need for PT consult/Monitor gait and stability/Provide patient with walking aids - walker, cane, crutches/Reinforce activity limits and safety measures with patient and family/Sit up slowly, dangle for a short time, stand at bedside before walking/Tailored Fall Risk Interventions/Visual Cue: Yellow wristband and red socks/Bed in lowest position, wheels locked, appropriate side rails in place/Call bell, personal items and telephone in reach/Instruct patient to call for assistance before getting out of bed or chair/Non-slip footwear when patient is out of bed/[place] to call system/Physically safe environment - no spills, clutter or unnecessary equipment/Purposeful Proactive Rounding/Room/bathroom lighting operational, light cord in reach

## 2023-08-20 NOTE — PATIENT PROFILE ADULT - NSTRANSFERBELONGINGSDISPO_GEN_A_NUR
has 1 pair shoes clothes, 2 hearing aids, dentures top and bottom, purse with ID, cell phone 11 necklace 2 charms with pt at this time/with patient

## 2023-08-20 NOTE — CONSULT NOTE ADULT - ASSESSMENT
The patient is an 89 year old female with a history of HTN, HL, CAD s/p PCI, pacemaker who presents with syncope.    Plan:  - ECG with appropriate atrial pacing  - Pacemaker interrogated - no events noted  - Check echo  - Check orthostatics  - Continue losartan 100 mg daily  - Continue amlodipine 2.5 mg daily  - Continue metoprolol succinate 25 mg daily  - Continue aspirin 81 mg daily  - Continue atorvastatin 20 mg daily (formulary equivalent)  - Mild hyponatremia noted - possible component of dehydration - repeat pending The patient is an 89 year old female with a history of HTN, HL, CAD s/p PCI, pacemaker who presents with syncope.    Plan:  - ECG with appropriate atrial pacing  - Pacemaker interrogated - no events noted  - Check echo  - Check orthostatics  - Continue losartan 100 mg daily  - Continue amlodipine 2.5 mg daily  - Continue metoprolol succinate 25 mg daily  - Continue aspirin 81 mg daily  - Continue atorvastatin 10 mg daily (formulary equivalent)  - Mild hyponatremia noted - possible component of dehydration - repeat pending

## 2023-08-20 NOTE — H&P ADULT - ASSESSMENT
89F with hypothyroid, CAD, HTN, HLD p/w dizziness  unclear etiology  cardio following  echo noted  continue tele    UTI  continue CTX  fu cx    mild hyponatremia  appears to be due to low solute  received saline  monitor    HTN/HLD/CAD  continue norvasc, losartan, lipitor, asa 89F with hypothyroid, CAD, HTN, HLD p/w dizziness  unclear etiology  cardio following  echo noted  continue tele  orthostatics ok  head ct noted  neurosurgery input appreciated    UTI  continue CTX  fu cx  check bladder scan    mild hyponatremia  appears to be due to low solute  received saline  monitor    HTN/HLD/CAD  continue norvasc, losartan, lipitor, asa    hypothyroid  continue synthroid

## 2023-08-20 NOTE — CONSULT NOTE ADULT - SUBJECTIVE AND OBJECTIVE BOX
History of Present Illness: The patient is an 89 year old female with a history of HTN, HL, CAD s/p PCI, pacemaker who presents with syncope. For the last 1-2 days she has been feeling lightheaded when getting up and walking. She states this happens every so often. She was walking when lightheadedness worsened and she passed out. No chest pain, shortness of breath, palpitations. She fell down, thinks she was out for a few seconds.    Past Medical/Surgical History:  HTN, HL, CAD s/p PCI, pacemaker     Medications:  Home Medications:  aspirin 81 mg oral tablet: 1 orally once a day (20 Aug 2023 08:47)  levothyroxine 50 mcg (0.05 mg) oral tablet: 1 tab(s) orally every other day (20 Aug 2023 08:48)  levothyroxine 75 mcg (0.075 mg) oral tablet: 1 tab(s) orally every other day (20 Aug 2023 08:48)  losartan 100 mg oral tablet: 1 orally once a day (20 Aug 2023 08:47)  lovastatin 40 mg oral tablet: 1 tab(s) orally once a day (20 Aug 2023 08:48)  Norvasc 2.5 mg oral tablet: 1 orally once a day (at bedtime) (20 Aug 2023 08:47)  Toprol-XL 25 mg oral tablet, extended release: 1 orally once a day (20 Aug 2023 08:47)      Family History: Non-contributory family history of premature cardiovascular atherosclerotic disease    Social History: No tobacco, alcohol or drug use    Review of Systems:  General: No fevers, chills, weight gain  Skin: No rashes, color changes  Cardiovascular: No chest pain, orthopnea  Respiratory: No shortness of breath, cough  Gastrointestinal: No nausea, abdominal pain  Genitourinary: No incontinence, pain with urination  Musculoskeletal: No pain, swelling, decreased range of motion  Neurological: No headache, weakness  Psychiatric: No depression, anxiety  Endocrine: No weight gain, increased thirst  All other systems are comprehensively negative.    Physical Exam:  Vitals:        Vital Signs Last 24 Hrs  T(C): 36.4 (20 Aug 2023 07:30), Max: 36.6 (20 Aug 2023 00:41)  T(F): 97.5 (20 Aug 2023 07:30), Max: 97.9 (20 Aug 2023 00:41)  HR: 65 (20 Aug 2023 07:30) (63 - 71)  BP: 164/69 (20 Aug 2023 07:30) (164/69 - 194/70)  BP(mean): --  RR: 18 (20 Aug 2023 07:30) (14 - 18)  SpO2: 98% (20 Aug 2023 07:30) (97% - 98%)  Parameters below as of 20 Aug 2023 07:30  Patient On (Oxygen Delivery Method): room air  General: NAD  HEENT: MMM  Neck: No JVD, no carotid bruit  Lungs: CTAB  CV: RRR, nl S1/S2, no M/R/G  Abdomen: S/NT/ND, +BS  Extremities: No LE edema, no cyanosis  Neuro: AAOx3, non-focal  Skin: No rash    Labs:                        13.0   8.11  )-----------( 204      ( 20 Aug 2023 01:45 )             37.9     08-20    129<L>  |  100  |  11  ----------------------------<  97  4.2   |  23  |  0.83    Ca    9.0      20 Aug 2023 01:45    TPro  6.5  /  Alb  3.4  /  TBili  0.5  /  DBili  x   /  AST  19  /  ALT  15  /  AlkPhos  69  08-20        PT/INR - ( 20 Aug 2023 01:45 )   PT: 10.7 sec;   INR: 0.91 ratio         PTT - ( 20 Aug 2023 01:45 )  PTT:26.9 sec    ECG/Telemetry: Atrial paced, no ST abnormality

## 2023-08-20 NOTE — CHART NOTE - NSCHARTNOTEFT_GEN_A_CORE
Pacemaker Interrogation    Company: [employer]    Type: Dual chamber pacemaker    Battery: 3 years    Parameters: AAIR <=> DDDR 60/130 bpm    Pacing: ASVS 10.6%, ASVP <0.1%, APVS 89.1%, APVP 0.3%    Events: No events    Leads:  RA: sense 3.8 mV, threshold 0.625 V @ 0.40 ms, impedance 627 ohms  RV: sense 14.5 mV, threshold 1.250 V @ 0.40 ms, impedance 475 ohms    Impression:  Normally functioning dual chamber pacemaker  No events notes at the time of syncope

## 2023-08-20 NOTE — ED PROVIDER NOTE - PROGRESS NOTE DETAILS
Discussed case with Dr. Joy from neurosurgery regarding CT findings states this is likely incidental finding to follow-up outpatient regarding this finding. Patient and family made aware of CT findings and blood work.  Patient still states she feels lightheaded.  Will admit. discussed case with DR. Tom will admit to Dr. Lee

## 2023-08-20 NOTE — ED ADULT NURSE NOTE - NSFALLHARMRISKINTERV_ED_ALL_ED

## 2023-08-21 LAB
ANION GAP SERPL CALC-SCNC: 7 MMOL/L — SIGNIFICANT CHANGE UP (ref 5–17)
BASOPHILS # BLD AUTO: 0.02 K/UL — SIGNIFICANT CHANGE UP (ref 0–0.2)
BASOPHILS NFR BLD AUTO: 0.2 % — SIGNIFICANT CHANGE UP (ref 0–2)
BUN SERPL-MCNC: 11 MG/DL — SIGNIFICANT CHANGE UP (ref 7–23)
CALCIUM SERPL-MCNC: 8.5 MG/DL — SIGNIFICANT CHANGE UP (ref 8.5–10.1)
CHLORIDE SERPL-SCNC: 104 MMOL/L — SIGNIFICANT CHANGE UP (ref 96–108)
CO2 SERPL-SCNC: 21 MMOL/L — LOW (ref 22–31)
CREAT SERPL-MCNC: 0.65 MG/DL — SIGNIFICANT CHANGE UP (ref 0.5–1.3)
CULTURE RESULTS: SIGNIFICANT CHANGE UP
EGFR: 84 ML/MIN/1.73M2 — SIGNIFICANT CHANGE UP
EOSINOPHIL # BLD AUTO: 0.12 K/UL — SIGNIFICANT CHANGE UP (ref 0–0.5)
EOSINOPHIL NFR BLD AUTO: 1.1 % — SIGNIFICANT CHANGE UP (ref 0–6)
GLUCOSE SERPL-MCNC: 95 MG/DL — SIGNIFICANT CHANGE UP (ref 70–99)
HCT VFR BLD CALC: 35.8 % — SIGNIFICANT CHANGE UP (ref 34.5–45)
HGB BLD-MCNC: 12.3 G/DL — SIGNIFICANT CHANGE UP (ref 11.5–15.5)
IMM GRANULOCYTES NFR BLD AUTO: 0.4 % — SIGNIFICANT CHANGE UP (ref 0–0.9)
LYMPHOCYTES # BLD AUTO: 0.78 K/UL — LOW (ref 1–3.3)
LYMPHOCYTES # BLD AUTO: 7.5 % — LOW (ref 13–44)
MCHC RBC-ENTMCNC: 30.5 PG — SIGNIFICANT CHANGE UP (ref 27–34)
MCHC RBC-ENTMCNC: 34.4 GM/DL — SIGNIFICANT CHANGE UP (ref 32–36)
MCV RBC AUTO: 88.8 FL — SIGNIFICANT CHANGE UP (ref 80–100)
MONOCYTES # BLD AUTO: 0.65 K/UL — SIGNIFICANT CHANGE UP (ref 0–0.9)
MONOCYTES NFR BLD AUTO: 6.2 % — SIGNIFICANT CHANGE UP (ref 2–14)
NEUTROPHILS # BLD AUTO: 8.83 K/UL — HIGH (ref 1.8–7.4)
NEUTROPHILS NFR BLD AUTO: 84.6 % — HIGH (ref 43–77)
NRBC # BLD: 0 /100 WBCS — SIGNIFICANT CHANGE UP (ref 0–0)
PLATELET # BLD AUTO: 178 K/UL — SIGNIFICANT CHANGE UP (ref 150–400)
POTASSIUM SERPL-MCNC: 3.6 MMOL/L — SIGNIFICANT CHANGE UP (ref 3.5–5.3)
POTASSIUM SERPL-SCNC: 3.6 MMOL/L — SIGNIFICANT CHANGE UP (ref 3.5–5.3)
RBC # BLD: 4.03 M/UL — SIGNIFICANT CHANGE UP (ref 3.8–5.2)
RBC # FLD: 12.6 % — SIGNIFICANT CHANGE UP (ref 10.3–14.5)
SODIUM SERPL-SCNC: 132 MMOL/L — LOW (ref 135–145)
SPECIMEN SOURCE: SIGNIFICANT CHANGE UP
URATE SERPL-MCNC: 4 MG/DL — SIGNIFICANT CHANGE UP (ref 2.5–7)
WBC # BLD: 10.44 K/UL — SIGNIFICANT CHANGE UP (ref 3.8–10.5)
WBC # FLD AUTO: 10.44 K/UL — SIGNIFICANT CHANGE UP (ref 3.8–10.5)

## 2023-08-21 RX ORDER — METOCLOPRAMIDE HCL 10 MG
5 TABLET ORAL ONCE
Refills: 0 | Status: COMPLETED | OUTPATIENT
Start: 2023-08-21 | End: 2023-08-21

## 2023-08-21 RX ADMIN — Medication 50 MICROGRAM(S): at 05:09

## 2023-08-21 RX ADMIN — AMLODIPINE BESYLATE 2.5 MILLIGRAM(S): 2.5 TABLET ORAL at 21:16

## 2023-08-21 RX ADMIN — Medication 75 MICROGRAM(S): at 05:09

## 2023-08-21 RX ADMIN — LOSARTAN POTASSIUM 100 MILLIGRAM(S): 100 TABLET, FILM COATED ORAL at 05:10

## 2023-08-21 RX ADMIN — Medication 81 MILLIGRAM(S): at 11:43

## 2023-08-21 RX ADMIN — Medication 25 MILLIGRAM(S): at 05:10

## 2023-08-21 RX ADMIN — ATORVASTATIN CALCIUM 20 MILLIGRAM(S): 80 TABLET, FILM COATED ORAL at 21:16

## 2023-08-21 RX ADMIN — Medication 5 MILLIGRAM(S): at 04:05

## 2023-08-21 RX ADMIN — ENOXAPARIN SODIUM 40 MILLIGRAM(S): 100 INJECTION SUBCUTANEOUS at 17:28

## 2023-08-21 NOTE — PHYSICAL THERAPY INITIAL EVALUATION ADULT - ADDITIONAL COMMENTS
Patient reports that she lives in a private house with family, 5-6 steps down to enter, ambulates with RW at baseline, independent with ADLs. Daughter assists with stair negotiation.

## 2023-08-21 NOTE — CHART NOTE - NSCHARTNOTEFT_GEN_A_CORE
RN called, unable to collect c. diff stool sample. Per RN, pt's last two BMs have been formed, therefore has not be able to collect a sample. Will d/c c. diff order and isolation precautions as low suspicion for c.diff given formed stool.

## 2023-08-21 NOTE — CARE COORDINATION ASSESSMENT. - NSDCPLANSERVICES_GEN_ALL_CORE
CM met with patient and her niece Olivia Tripathi who is also one her caregivers alongside with the patient's daughter.  Patient is [place], not wearing her hearing aides during the time of my visit with her.  Per patient and her niece, pt lives in daughter's basement apartment. Her daughter and her niece are her caregivers.  They will continue to assist the patient upon discharge.  Patient was using a rolling walker prior to hospital admission.   Patient ambulated independently with the rolling walker and does not want any new DME.  Pt was seen by PT and recommended no skilled needs upon discharge.    PCP: Dr Leary [phone].  Pharmacy [facility] ([facility] in [place]). CM met with patient and her niece Olivia Tripathi who is also one her caregivers alongside with the patient's daughter.  Patient is [place], not wearing her hearing aides during the time of my visit with her.  Per patient and her niece, pt lives in daughter's basement apartment. Her daughter and her niece are her caregivers.  They will continue to assist the patient upon discharge.  Patient was using a rolling walker prior to hospital admission.   Patient ambulated independently with the rolling walker and does not want any new DME.  Pt denied any hc services prior to hosp admission. Pt was seen by PT and recommended no skilled needs upon discharge.    PCP: Dr Leary [phone].  Pharmacy [facility] ([facility] in [place]).

## 2023-08-21 NOTE — CONSULT NOTE ADULT - SUBJECTIVE AND OBJECTIVE BOX
[facility], Division of Infectious Diseases  VANESSA Newby S. Shah, Y. Patel, G. [facility]  [phone]    MATI GUNN  89y, Female  [id number]    HPI--  HPI:  89F with hx of hypothyroid HTN, HLD, CAD, PPM p/w fall.  Pt states she felt dizzy and lost her balance. She is unclear if she blacked out or not. Family reports she has been dizzy for a few days. Pt reports not feeling ill otherwise. does note frequent nocturia (20 Aug 2023 11:39)  S/p cardio eval and PPM interrogation  ID c/s for further evaluation    Pt seen at bedside  reporting increased urinary frequency   reporting diarrhea      Active Medications--  acetaminophen     Tablet .. 650 milliGRAM(s) Oral every 6 hours PRN  aluminum hydroxide/magnesium hydroxide/simethicone Suspension 30 milliLiter(s) Oral every 4 hours PRN  amLODIPine   Tablet 2.5 milliGRAM(s) Oral at bedtime  aspirin enteric coated 81 milliGRAM(s) Oral daily  atorvastatin 20 milliGRAM(s) Oral at bedtime  cefTRIAXone   IVPB 1000 milliGRAM(s) IV Intermittent every 24 hours  enoxaparin Injectable 40 milliGRAM(s) SubCutaneous every 24 hours  levothyroxine 50 MICROGram(s) Oral <User Schedule>  levothyroxine 75 MICROGram(s) Oral <User Schedule>  losartan 100 milliGRAM(s) Oral daily  melatonin 3 milliGRAM(s) Oral at bedtime PRN  metoprolol succinate ER 25 milliGRAM(s) Oral daily  ondansetron Injectable 4 milliGRAM(s) IV Push every 8 hours PRN    Antimicrobials:   cefTRIAXone   IVPB 1000 milliGRAM(s) IV Intermittent every 24 hours    Immunologic:     ROS:  CONSTITUTIONAL: No fevers or chills. No weakness or headache. No weight changes.  EYES/ENT: No visual or hearing changes. No sore throat or throat pain .  NECK: No pain or stiffness  RESPIRATORY: No cough, wheezing, or hemoptysis. No shortness of breath  CARDIOVASCULAR: No chest pain or palpitations  GASTROINTESTINAL: No abdominal pain. No nausea or vomiting. No diarrhea or constipation.  GENITOURINARY: No dysuria, frequency or hematuria  NEUROLOGICAL: No numbness or weakness  SKIN: No itching or rashes  PSYCHIATRIC: Pleasant. Appropriate affect    Allergies: No Known Allergies    PMH -- CAD (coronary artery disease)    HTN (hypertension)    HLD (hyperlipidemia)    GERD (gastroesophageal reflux disease)    Hypothyroid      PSH -- No significant past surgical history      FH -- No pertinent family history in first degree relatives      Social History --  EtOH: denies   Tobacco: denies   Drug Use: denies     Travel/Environmental/Occupational History:    Physical Exam--  Vital Signs Last 24 Hrs  T(F): 97.9 (21 Aug 2023 12:21), Max: 97.9 (21 Aug 2023 12:21)  HR: 65 (21 Aug 2023 12:21) (61 - 72)  BP: 139/70 (21 Aug 2023 12:21) (128/65 - 161/69)  RR: 17 (21 Aug 2023 12:21) (17 - 18)  SpO2: 96% (21 Aug 2023 12:21) (91% - 96%)  General: nontoxic-appearing, no acute distress  HEENT: NC/AT, EOMI, anicteric,   Lungs: Clear bilaterally without rales, wheezing or rhonchi  Heart: Regular rate and rhythm. No murmur, rub or gallop.  Abdomen: Soft. Nondistended. Nontender.   Extremities: No cyanosis or clubbing. No edema.   Skin: Warm. Dry. Good turgor.    Laboratory & Imaging Data:  CBC:                       12.3   10.44 )-----------( 178      ( 21 Aug 2023 05:53 )             35.8     CMP: 08-21    132<L>  |  104  |  11  ----------------------------<  95  3.6   |  21<L>  |  0.65    Ca    8.5      21 Aug 2023 05:53    TPro  6.5  /  Alb  3.4  /  TBili  0.5  /  DBili  x   /  AST  19  /  ALT  15  /  AlkPhos  69  08-20    LIVER FUNCTIONS - ( 20 Aug 2023 01:45 )  Alb: 3.4 g/dL / Pro: 6.5 g/dL / ALK PHOS: 69 U/L / ALT: 15 U/L / AST: 19 U/L / GGT: x           Urinalysis Basic - ( 21 Aug 2023 05:53 )    Color: x / Appearance: x / SG: x / pH: x  Gluc: 95 mg/dL / Ketone: x  / Bili: x / Urobili: x   Blood: x / Protein: x / Nitrite: x   Leuk Esterase: x / RBC: x / WBC x   Sq Epi: x / Non Sq Epi: x / Bacteria: x        Microbiology: reviewed        Radiology: reviewed    < from: CT Head No Cont (08.20.23 @ 03:08) >    ACC: [id number] EXAM:  CT BRAIN   ORDERED BY: KOURTNEY OQUENDO     PROCEDURE DATE:  08/20/2023          INTERPRETATION:  EXAMINATION: CT HEAD    DATE: 8/20/2023 3:08 AM    INDICATION: Dizziness, lightheadedness    COMPARISON: None available.    TECHNIQUE: Thin section noncontrast axial images were obtained through   the head.  RAPID AI was utilized for preliminary assessment of   intracranial hemorrhage.    FINDINGS:  Intracranial Contents:    Moderate to severe atrophy and mild to moderate chronic microvascular   ischemic change. Gray-white differentiation is preserved. No   hydrocephalus. The basilar cisterns are clear. No focal edema. 6 mm left   to right midline shift. There is low density widening of the left   convexity subdural space.    Bones and Extracranial Soft Tissues:    There is no fracture identified. The visualized paranasal sinuses have   minimal mucosal thickening. Trace effusion in the left mastoid air cells.   Scalp and imaged facial soft tissues are within normal limits.      IMPRESSION:  1. 6 mm left-to-right midline shift, with possible left convexity   hygroma. MRI correlation advised.    --- End of Report ---            CELIA MEYERS MD; Attending Radiologist  This document has been electronically signed. Aug 20 2023  3:46AM    < end of copied text >

## 2023-08-21 NOTE — PHYSICAL THERAPY INITIAL EVALUATION ADULT - PERTINENT HX OF CURRENT PROBLEM, REHAB EVAL
The patient is an 89 year old female with a history of HTN, HL, CAD s/p PCI, pacemaker who presents with syncope.

## 2023-08-21 NOTE — CARE COORDINATION ASSESSMENT. - NSPASTMEDSURGHISTORY_GEN_ALL_CORE_FT
PAST MEDICAL & SURGICAL HISTORY:  Hypothyroid      GERD (gastroesophageal reflux disease)      HLD (hyperlipidemia)      HTN (hypertension)      CAD (coronary artery disease)  1 stent (2002) and PPM (2016)      No significant past surgical history

## 2023-08-21 NOTE — CONSULT NOTE ADULT - ASSESSMENT
89F with hypothyroid, CAD, HTN, HLD p/w dizziness  ID c/s for r/o infectious etiology    Pt reporting diarrhea  Suspect dizziness 2/2 hypovolemia/volume loss in setting of diarrhea  Currently on isolation for r/o C. diff  Send GI PCR as well, pt w/ hx of EPEC in past    UA bland, pt w/o dysuria  UCx pending  Can d/c ceftriaxone    Monitor off Abx  Trend temps/WBC    Additional care per primary team    D/w Dr. Lee    Infectious Diseases will continue to follow. Please call with any questions.   Gretchen Eckert M.D.  [facility] Division of Infectious Diseases [phone]

## 2023-08-21 NOTE — CARE COORDINATION ASSESSMENT. - NSCAREPROVIDERS_GEN_ALL_CORE_FT
CARE PROVIDERS:  Accepting Physician: Lorenzo Lee  Administration: Bibi Burrell  Administration: Ivelisse Newsome  Administration: Keegan Kyle  Admitting: Lorenzo Lee  Attending: Lorenzo Lee  Case Management: Peggy Santos  Case Management: Abbie Rizo  Consultant: Poonam Latif  Consultant: Fabian Chi  Covering Team: Olena Liu  Covering Team: Derek Tom  Covering Team: Poonam Latif  ED Attending: Jhon Mancia  ED Nurse: Luis Morocho  Nurse: Frances Mendoza  Ordered: Doctor, Unknown  PCA/Nursing Assistant: Mira Davidson  PCA/Nursing Assistant: Sobeida Carson  Registered Dietitian: Clarita Gomez

## 2023-08-22 ENCOUNTER — TRANSCRIPTION ENCOUNTER (OUTPATIENT)
Age: 88
End: 2023-08-22

## 2023-08-22 VITALS
RESPIRATION RATE: 18 BRPM | DIASTOLIC BLOOD PRESSURE: 68 MMHG | HEART RATE: 65 BPM | SYSTOLIC BLOOD PRESSURE: 135 MMHG | OXYGEN SATURATION: 96 % | TEMPERATURE: 98 F

## 2023-08-22 LAB
ANION GAP SERPL CALC-SCNC: 6 MMOL/L — SIGNIFICANT CHANGE UP (ref 5–17)
BASOPHILS # BLD AUTO: 0.01 K/UL — SIGNIFICANT CHANGE UP (ref 0–0.2)
BASOPHILS NFR BLD AUTO: 0.2 % — SIGNIFICANT CHANGE UP (ref 0–2)
BUN SERPL-MCNC: 6 MG/DL — LOW (ref 7–23)
CALCIUM SERPL-MCNC: 8.7 MG/DL — SIGNIFICANT CHANGE UP (ref 8.5–10.1)
CHLORIDE SERPL-SCNC: 102 MMOL/L — SIGNIFICANT CHANGE UP (ref 96–108)
CO2 SERPL-SCNC: 25 MMOL/L — SIGNIFICANT CHANGE UP (ref 22–31)
CREAT SERPL-MCNC: 0.57 MG/DL — SIGNIFICANT CHANGE UP (ref 0.5–1.3)
EGFR: 87 ML/MIN/1.73M2 — SIGNIFICANT CHANGE UP
EOSINOPHIL # BLD AUTO: 0.15 K/UL — SIGNIFICANT CHANGE UP (ref 0–0.5)
EOSINOPHIL NFR BLD AUTO: 2.3 % — SIGNIFICANT CHANGE UP (ref 0–6)
FOLATE SERPL-MCNC: 6.6 NG/ML — SIGNIFICANT CHANGE UP
GLUCOSE SERPL-MCNC: 92 MG/DL — SIGNIFICANT CHANGE UP (ref 70–99)
HCT VFR BLD CALC: 35 % — SIGNIFICANT CHANGE UP (ref 34.5–45)
HGB BLD-MCNC: 12.3 G/DL — SIGNIFICANT CHANGE UP (ref 11.5–15.5)
IMM GRANULOCYTES NFR BLD AUTO: 0.3 % — SIGNIFICANT CHANGE UP (ref 0–0.9)
LYMPHOCYTES # BLD AUTO: 1.63 K/UL — SIGNIFICANT CHANGE UP (ref 1–3.3)
LYMPHOCYTES # BLD AUTO: 25.1 % — SIGNIFICANT CHANGE UP (ref 13–44)
MCHC RBC-ENTMCNC: 30.8 PG — SIGNIFICANT CHANGE UP (ref 27–34)
MCHC RBC-ENTMCNC: 35.1 GM/DL — SIGNIFICANT CHANGE UP (ref 32–36)
MCV RBC AUTO: 87.7 FL — SIGNIFICANT CHANGE UP (ref 80–100)
MONOCYTES # BLD AUTO: 0.79 K/UL — SIGNIFICANT CHANGE UP (ref 0–0.9)
MONOCYTES NFR BLD AUTO: 12.2 % — SIGNIFICANT CHANGE UP (ref 2–14)
NEUTROPHILS # BLD AUTO: 3.9 K/UL — SIGNIFICANT CHANGE UP (ref 1.8–7.4)
NEUTROPHILS NFR BLD AUTO: 59.9 % — SIGNIFICANT CHANGE UP (ref 43–77)
NRBC # BLD: 0 /100 WBCS — SIGNIFICANT CHANGE UP (ref 0–0)
PLATELET # BLD AUTO: 174 K/UL — SIGNIFICANT CHANGE UP (ref 150–400)
POTASSIUM SERPL-MCNC: 3.5 MMOL/L — SIGNIFICANT CHANGE UP (ref 3.5–5.3)
POTASSIUM SERPL-SCNC: 3.5 MMOL/L — SIGNIFICANT CHANGE UP (ref 3.5–5.3)
RBC # BLD: 3.99 M/UL — SIGNIFICANT CHANGE UP (ref 3.8–5.2)
RBC # FLD: 12.5 % — SIGNIFICANT CHANGE UP (ref 10.3–14.5)
SODIUM SERPL-SCNC: 133 MMOL/L — LOW (ref 135–145)
TSH SERPL-MCNC: 4.65 UIU/ML — HIGH (ref 0.36–3.74)
VIT B12 SERPL-MCNC: 1155 PG/ML — SIGNIFICANT CHANGE UP (ref 232–1245)
WBC # BLD: 6.5 K/UL — SIGNIFICANT CHANGE UP (ref 3.8–10.5)
WBC # FLD AUTO: 6.5 K/UL — SIGNIFICANT CHANGE UP (ref 3.8–10.5)

## 2023-08-22 PROCEDURE — 84484 ASSAY OF TROPONIN QUANT: CPT

## 2023-08-22 PROCEDURE — 36415 COLL VENOUS BLD VENIPUNCTURE: CPT

## 2023-08-22 PROCEDURE — 85610 PROTHROMBIN TIME: CPT

## 2023-08-22 PROCEDURE — 93005 ELECTROCARDIOGRAM TRACING: CPT

## 2023-08-22 PROCEDURE — 93306 TTE W/DOPPLER COMPLETE: CPT

## 2023-08-22 PROCEDURE — 80048 BASIC METABOLIC PNL TOTAL CA: CPT

## 2023-08-22 PROCEDURE — 81001 URINALYSIS AUTO W/SCOPE: CPT

## 2023-08-22 PROCEDURE — 83690 ASSAY OF LIPASE: CPT

## 2023-08-22 PROCEDURE — 83935 ASSAY OF URINE OSMOLALITY: CPT

## 2023-08-22 PROCEDURE — 80053 COMPREHEN METABOLIC PANEL: CPT

## 2023-08-22 PROCEDURE — 70450 CT HEAD/BRAIN W/O DYE: CPT | Mod: MA

## 2023-08-22 PROCEDURE — 85025 COMPLETE CBC W/AUTO DIFF WBC: CPT

## 2023-08-22 PROCEDURE — 82746 ASSAY OF FOLIC ACID SERUM: CPT

## 2023-08-22 PROCEDURE — 84443 ASSAY THYROID STIM HORMONE: CPT

## 2023-08-22 PROCEDURE — 97162 PT EVAL MOD COMPLEX 30 MIN: CPT

## 2023-08-22 PROCEDURE — 85730 THROMBOPLASTIN TIME PARTIAL: CPT

## 2023-08-22 PROCEDURE — 84550 ASSAY OF BLOOD/URIC ACID: CPT

## 2023-08-22 PROCEDURE — 84300 ASSAY OF URINE SODIUM: CPT

## 2023-08-22 PROCEDURE — 99285 EMERGENCY DEPT VISIT HI MDM: CPT

## 2023-08-22 PROCEDURE — 87086 URINE CULTURE/COLONY COUNT: CPT

## 2023-08-22 PROCEDURE — 82570 ASSAY OF URINE CREATININE: CPT

## 2023-08-22 PROCEDURE — 82607 VITAMIN B-12: CPT

## 2023-08-22 RX ADMIN — Medication 81 MILLIGRAM(S): at 12:29

## 2023-08-22 RX ADMIN — Medication 25 MILLIGRAM(S): at 05:27

## 2023-08-22 RX ADMIN — LOSARTAN POTASSIUM 100 MILLIGRAM(S): 100 TABLET, FILM COATED ORAL at 05:27

## 2023-08-22 NOTE — DISCHARGE NOTE PROVIDER - HOSPITAL COURSE
89F with hx of hypothyroid HTN, HLD, CAD, PPM p/w fall.  Pt states she felt dizzy and lost her balance. She is unclear if she blacked out or not. Family reports she has been dizzy for a few days. Pt reports not feeling ill otherwise. does note frequent nocturia. Pt was noted to be mildly hyponatremic which improved with fluids. She had pyuria and was started on abx. She subsequently developed diarrhea. She was seen by ID and the antibiotics were stopped and the diarrhea resolved. She was also seen by cardiology. dizziness resolved and pt was dced home.   LABS:                     12.3   6.50  )-----------( 174      ( 22 Aug 2023 07:02 )             35.0   08-22  133<L>  |  102  |  6<L>  ----------------------------<  92  3.5   |  25  |  0.57  Ca    8.7      22 Aug 2023 07:02  Sodium: 132 mmol/L (08.21.23 @ 05:53)   Sodium: 129 mmol/L (08.20.23 @ 01:45)   Folate, Serum: 6.6 ng/mL   Vitamin B12, Serum: 1155 pg/mL  Thyroid Stimulating Hormone, Serum: 4.65 uIU/mL  White Blood Cell - Urine: 10 /HPF   Osmolality, Random Urine: 187 mosm/Kg  Sodium, Random Urine: 40  Creatinine, Random Urine: 32  RADIOLOGY & ADDITIONAL TESTS:  < from: CT Head No Cont (08.20.23 @ 03:08) >    Intracranial Contents:    Moderate to severe atrophy and mild to moderate chronic microvascular   ischemic change. Gray-white differentiation is preserved. No   hydrocephalus. The basilar cisterns are clear. No focal edema. 6 mm left   to right midline shift. There is low density widening of the left   convexity subdural space.    Bones and Extracranial Soft Tissues:    There is no fracture identified. The visualized paranasal sinuses have   minimal mucosal thickening. Trace effusion in the left mastoid air cells.   Scalp and imaged facial soft tissues are within normal limits.      IMPRESSION:  1. 6 mm left-to-right midline shift, with possible left convexity   hygroma. MRI correlation advised.    < end of copied text >

## 2023-08-22 NOTE — PROGRESS NOTE ADULT - ASSESSMENT
89F with hypothyroid, CAD, HTN, HLD p/w dizziness  unclear etiology  cardio following  echo noted  continue tele  orthostatics ok  head ct noted  neurosurgery and neurology input appreciated    pyuria  ID input appreciated  hold further abx    diarrhea  check c diff    mild hyponatremia  appears to be due to low solute  received saline  improved    HTN/HLD/CAD  continue norvasc, losartan, lipitor, asa    hypothyroid  continue synthroid
The patient is an 89 year old female with a history of HTN, HL, CAD s/p PCI, pacemaker who presents with syncope.    Plan:  - ECG with appropriate atrial pacing  - Pacemaker interrogated - no events noted  - Echo with normal LV systolic function, no valve issues  - Orthostatics negative  - Continue losartan 100 mg daily  - Continue amlodipine 2.5 mg daily  - Continue metoprolol succinate 25 mg daily  - Continue aspirin 81 mg daily  - Continue atorvastatin 10 mg daily (formulary equivalent)  - Mild hyponatremia noted - possible component of dehydration  - Ok for discharge from a cardiac standpoint
89F with hypothyroid, CAD, HTN, HLD p/w dizziness  ID c/s for r/o infectious etiology    Pt reporting diarrhea--now resolved  Can d/c stool studies    UA bland, pt w/o dysuria  UCx contaminated    Monitor off Abx  Trend temps/WBC    Stable from ID standpoint  D/c planning and additional care per primary team    D/w Dr. Lee    Infectious Diseases will continue to follow. Please call with any questions.   Gretchen Eckert M.D.  [facility] Division of Infectious Diseases [phone]  
The patient is an 89 year old female with a history of HTN, HL, CAD s/p PCI, pacemaker who presents with syncope.    Plan:  - ECG with appropriate atrial pacing  - Pacemaker interrogated - no events noted  - Echo with normal LV systolic function, no valve issues  - Orthostatics negative  - Continue losartan 100 mg daily  - Continue amlodipine 2.5 mg daily  - Continue metoprolol succinate 25 mg daily  - Continue aspirin 81 mg daily  - Continue atorvastatin 10 mg daily (formulary equivalent)  - Mild hyponatremia noted - possible component of dehydration  - Ok for discharge from a cardiac standpoint

## 2023-08-22 NOTE — PROGRESS NOTE ADULT - SUBJECTIVE AND OBJECTIVE BOX
Neurology Follow up note    MATI [id number]    HPI:  89F with hx of hypothyroid HTN, HLD, CAD, PPM p/w fall.  Pt states she felt dizzy and lost her balance. She is unclear if she blacked out or not. Family reports she has been dizzy for a few days. Pt reports not feeling ill otherwise. does note frequent nocturia (20 Aug 2023 11:39)      Interval History -doing well; no dizziness    Patient is seen, chart was reviewed and case was discussed with the treatment team.  Pt is not in any distress.   Lying on bed comfortably.   No events reported overnight.       Vital Signs Last 24 Hrs  T(C): 36.8 (22 Aug 2023 12:04), Max: 36.8 (22 Aug 2023 12:04)  T(F): 98.3 (22 Aug 2023 12:04), Max: 98.3 (22 Aug 2023 12:04)  HR: 65 (22 Aug 2023 12:04) (64 - 65)  BP: 135/68 (22 Aug 2023 12:04) (135/68 - 158/68)  BP(mean): --  RR: 18 (22 Aug 2023 12:04) (17 - 18)  SpO2: 96% (22 Aug 2023 12:04) (93% - 96%)    Parameters below as of 22 Aug 2023 12:04  Patient On (Oxygen Delivery Method): room air            REVIEW OF SYSTEMS:    Constitutional: No fever, weight loss or fatigue  Eyes: No eye pain, visual disturbances, or discharge  ENT:  No difficulty hearing, tinnitus, ; No sinus or throat pain  Neck: No pain or stiffness  Respiratory: No  wheezing, chills or hemoptysis  Cardiovascular: No chest pain, palpitations, shortness of breath,   Gastrointestinal: No abdominal or epigastric pain. No nausea, vomiting or hematemesis;  Genitourinary: No dysuria, frequency, hematuria or incontinence  Neurological: No headaches, memory loss, loss of strength,   Psychiatric: No depression, anxiety, mood swings or difficulty sleeping  Musculoskeletal: No  swelling; No muscle, back pain  Skin: No itching, burning, rashes or lesions   Lymph Nodes: No enlarged glands  Endocrine: No heat or cold intolerance; No hair loss,   Allergy and Immunologic: No hives or eczema    On Neurological Examination:    Mental Status - Pt is alert, awake, oriented X3. . Follows commands well and able to answer questions appropriately.Mood and affect  normal    Speech -  Normal.     Cranial Nerves - Pupils 3 mm equal and reactive to light, extraocular eye movements intact. Pt has no visual field deficit.  Pt has no facial asymmetry. Facial sensation is intact.Tongue - is in midline.    Muscle tone - is normal      Motor Exam - 5/5 of UE  RLE 3/5; LLE 4/5   No drift. No shaking or tremors.    Sensory Exam - . Pt withdraws all extremities equally on stimulation. No asymmetry seen. No complaints of tingling, numbness.      coordination:    Finger to nose: normal      Deep tendon Reflexes - 2 plus all over.     .    Neck Supple -  Yes.     MEDICATIONS    acetaminophen     Tablet .. 650 milliGRAM(s) Oral every 6 hours PRN  aluminum hydroxide/magnesium hydroxide/simethicone Suspension 30 milliLiter(s) Oral every 4 hours PRN  amLODIPine   Tablet 2.5 milliGRAM(s) Oral at bedtime  aspirin enteric coated 81 milliGRAM(s) Oral daily  atorvastatin 20 milliGRAM(s) Oral at bedtime  enoxaparin Injectable 40 milliGRAM(s) SubCutaneous every 24 hours  levothyroxine 50 MICROGram(s) Oral <User Schedule>  levothyroxine 75 MICROGram(s) Oral <User Schedule>  losartan 100 milliGRAM(s) Oral daily  melatonin 3 milliGRAM(s) Oral at bedtime PRN  metoprolol succinate ER 25 milliGRAM(s) Oral daily  ondansetron Injectable 4 milliGRAM(s) IV Push every 8 hours PRN      Allergies    No Known Allergies    Intolerances        LABS:  CBC Full  -  ( 22 Aug 2023 07:02 )  WBC Count : 6.50 K/uL  RBC Count : 3.99 M/uL  Hemoglobin : 12.3 g/dL  Hematocrit : 35.0 %  Platelet Count - Automated : 174 K/uL  Mean Cell Volume : 87.7 fl  Mean Cell Hemoglobin : 30.8 pg  Mean Cell Hemoglobin Concentration : 35.1 gm/dL  Auto Neutrophil # : 3.90 K/uL  Auto Lymphocyte # : 1.63 K/uL  Auto Monocyte # : 0.79 K/uL  Auto Eosinophil # : 0.15 K/uL  - ( 22 Aug 2023 07:02 )    Color: x / Appearance: x / SG: x / pH: x  Gluc: 92 mg/dL / Ketone: x  / Bili: x / Urobili: x   Blood: x / Protein: x / Nitrite: x   Leuk Esterase: x / RBC: x / WBC x   Sq Epi: x / Non Sq Epi: x / Bacteria: x      08-22    133<L>  |  102  |  6<L>  ----------------------------<  92  3.5   |  25  |  0.57    Ca    8.7      22 Aug 2023 07:02      Hemoglobin A1C:     Vitamin B12 Vitamin B12, Serum: 1155 pg/mL (08-22 @ 07:02)      RADIOLOGY    ASSESSMENT AND PLAN:      SEEN FOR DIZZINESS-RESOLVED  CT HEAD- NO ACUTE CVA  NO MRI AS PATIENT HAS PPM    discharge planning  Physical therapy evaluation.  OOB to chair/ambulation with assistance only.  Pain is accessed and addressed.  Plan of care was discussed with family. Questions answered.  Would continue to follow.              
Chief Complaint: Syncope    Interval Events: No events overnight.    Review of Systems:  General: No fevers, chills, weight gain  Skin: No rashes, color changes  Cardiovascular: No chest pain, orthopnea  Respiratory: No shortness of breath, cough  Gastrointestinal: No nausea, abdominal pain  Genitourinary: No incontinence, pain with urination  Musculoskeletal: No pain, swelling, decreased range of motion  Neurological: No headache, weakness  Psychiatric: No depression, anxiety  Endocrine: No weight gain, increased thirst  All other systems are comprehensively negative.    Physical Exam:  Vital Signs Last 24 Hrs  T(C): 36.4 (22 Aug 2023 06:18), Max: 36.6 (21 Aug 2023 12:21)  T(F): 97.5 (22 Aug 2023 06:18), Max: 97.9 (21 Aug 2023 12:21)  HR: 64 (21 Aug 2023 20:38) (64 - 65)  BP: 158/68 (22 Aug 2023 06:18) (139/70 - 158/68)  BP(mean): --  RR: 18 (22 Aug 2023 06:18) (17 - 18)  SpO2: 94% (22 Aug 2023 06:18) (93% - 96%)  Parameters below as of 22 Aug 2023 06:18  Patient On (Oxygen Delivery Method): room air  General: NAD  HEENT: MMM  Neck: No JVD, no carotid bruit  Lungs: CTAB  CV: RRR, nl S1/S2, no M/R/G  Abdomen: S/NT/ND, +BS  Extremities: No LE edema, no cyanosis  Neuro: AAOx3, non-focal  Skin: No rash    Labs:    08-21    132<L>  |  104  |  11  ----------------------------<  95  3.6   |  21<L>  |  0.65    Ca    8.5      21 Aug 2023 05:53                          12.3   6.50  )-----------( 174      ( 22 Aug 2023 07:02 )             35.0       ECG/Telemetry: Sinus rhythm
Chief Complaint: Syncope    Interval Events: No events overnight.    Review of Systems:  General: No fevers, chills, weight gain  Skin: No rashes, color changes  Cardiovascular: No chest pain, orthopnea  Respiratory: No shortness of breath, cough  Gastrointestinal: No nausea, abdominal pain  Genitourinary: No incontinence, pain with urination  Musculoskeletal: No pain, swelling, decreased range of motion  Neurological: No headache, weakness  Psychiatric: No depression, anxiety  Endocrine: No weight gain, increased thirst  All other systems are comprehensively negative.    Physical Exam:  Vitals:        Vital Signs Last 24 Hrs  T(C): 36.6 (21 Aug 2023 04:00), Max: 36.6 (20 Aug 2023 14:01)  T(F): 97.8 (21 Aug 2023 04:00), Max: 97.9 (20 Aug 2023 14:01)  HR: 72 (21 Aug 2023 04:00) (61 - 72)  BP: 146/64 (21 Aug 2023 04:00) (128/65 - 169/70)  BP(mean): --  RR: 18 (21 Aug 2023 04:00) (17 - 18)  SpO2: 91% (21 Aug 2023 04:00) (91% - 96%)  Parameters below as of 21 Aug 2023 04:00  Patient On (Oxygen Delivery Method): room air  General: NAD  HEENT: MMM  Neck: No JVD, no carotid bruit  Lungs: CTAB  CV: RRR, nl S1/S2, no M/R/G  Abdomen: S/NT/ND, +BS  Extremities: No LE edema, no cyanosis  Neuro: AAOx3, non-focal  Skin: No rash    Labs:                        12.3   10.44 )-----------( 178      ( 21 Aug 2023 05:53 )             35.8     08-21    132<L>  |  104  |  11  ----------------------------<  95  3.6   |  21<L>  |  0.65    Ca    8.5      21 Aug 2023 05:53    TPro  6.5  /  Alb  3.4  /  TBili  0.5  /  DBili  x   /  AST  19  /  ALT  15  /  AlkPhos  69  08-20        PT/INR - ( 20 Aug 2023 01:45 )   PT: 10.7 sec;   INR: 0.91 ratio         PTT - ( 20 Aug 2023 01:45 )  PTT:26.9 sec    ECG/Telemetry: Sinus rhythm
Patient is a 89y old  Female who presents with a chief complaint of       INTERVAL HPI/OVERNIGHT EVENTS:   dizziness better, but now has diarrhea  pt seen and examined         Vital Signs Last 24 Hrs  T(C): 36.6 (21 Aug 2023 12:21), Max: 36.6 (21 Aug 2023 04:00)  T(F): 97.9 (21 Aug 2023 12:21), Max: 97.9 (21 Aug 2023 12:21)  HR: 65 (21 Aug 2023 12:21) (63 - 72)  BP: 139/70 (21 Aug 2023 12:21) (139/70 - 161/69)  BP(mean): --  RR: 17 (21 Aug 2023 12:21) (17 - 18)  SpO2: 96% (21 Aug 2023 12:21) (91% - 96%)    Parameters below as of 21 Aug 2023 12:21  Patient On (Oxygen Delivery Method): room air        acetaminophen     Tablet .. 650 milliGRAM(s) Oral every 6 hours PRN  aluminum hydroxide/magnesium hydroxide/simethicone Suspension 30 milliLiter(s) Oral every 4 hours PRN  amLODIPine   Tablet 2.5 milliGRAM(s) Oral at bedtime  aspirin enteric coated 81 milliGRAM(s) Oral daily  atorvastatin 20 milliGRAM(s) Oral at bedtime  enoxaparin Injectable 40 milliGRAM(s) SubCutaneous every 24 hours  levothyroxine 50 MICROGram(s) Oral <User Schedule>  levothyroxine 75 MICROGram(s) Oral <User Schedule>  losartan 100 milliGRAM(s) Oral daily  melatonin 3 milliGRAM(s) Oral at bedtime PRN  metoprolol succinate ER 25 milliGRAM(s) Oral daily  ondansetron Injectable 4 milliGRAM(s) IV Push every 8 hours PRN      PHYSICAL EXAM:  GENERAL: NAD   EYES: conjunctiva and sclera clear  ENMT: Moist mucous membranes  NECK: Supple, No JVD, Normal thyroid  CHEST/LUNG: non labored, cta b/l  HEART: Regular rate and rhythm; No murmurs, rubs, or gallops  ABDOMEN: Soft, Nontender, Nondistended; Bowel sounds present  EXTREMITIES:  2+ Peripheral Pulses, No clubbing, cyanosis, or edema  LYMPH: No lymphadenopathy noted  SKIN: No rashes or lesions    Consultant(s) Notes Reviewed:  [x ] YES  [ ] NO  Care Discussed with Consultants/Other Providers [ x] YES  [ ] NO    LABS:                        12.3   10.44 )-----------( 178      ( 21 Aug 2023 05:53 )             35.8     08-21    132<L>  |  104  |  11  ----------------------------<  95  3.6   |  21<L>  |  0.65    Ca    8.5      21 Aug 2023 05:53    TPro  6.5  /  Alb  3.4  /  TBili  0.5  /  DBili  x   /  AST  19  /  ALT  15  /  AlkPhos  69  08-20    PT/INR - ( 20 Aug 2023 01:45 )   PT: 10.7 sec;   INR: 0.91 ratio         PTT - ( 20 Aug 2023 01:45 )  PTT:26.9 sec  Urinalysis Basic - ( 21 Aug 2023 05:53 )    Color: x / Appearance: x / SG: x / pH: x  Gluc: 95 mg/dL / Ketone: x  / Bili: x / Urobili: x   Blood: x / Protein: x / Nitrite: x   Leuk Esterase: x / RBC: x / WBC x   Sq Epi: x / Non Sq Epi: x / Bacteria: x      CAPILLARY BLOOD GLUCOSE            Urinalysis Basic - ( 21 Aug 2023 05:53 )    Color: x / Appearance: x / SG: x / pH: x  Gluc: 95 mg/dL / Ketone: x  / Bili: x / Urobili: x   Blood: x / Protein: x / Nitrite: x   Leuk Esterase: x / RBC: x / WBC x   Sq Epi: x / Non Sq Epi: x / Bacteria: x          RADIOLOGY & ADDITIONAL TESTS:    Imaging Personally Reviewed  Reviewed consultants input
[facility], Division of Infectious Diseases  VANESSA Newby Y. Patel, S. Shah, G. [facility]  [phone]    Name: MAIT GUNN  Age: 89y  Gender: Female  MRN: [id number]    Interval History:  Patient seen and examined at bedside this morning  No acute overnight events. Afebrile  Stools formed  No complaints  Notes reviewed    Antibiotics:      Medications:  acetaminophen     Tablet .. 650 milliGRAM(s) Oral every 6 hours PRN  aluminum hydroxide/magnesium hydroxide/simethicone Suspension 30 milliLiter(s) Oral every 4 hours PRN  amLODIPine   Tablet 2.5 milliGRAM(s) Oral at bedtime  aspirin enteric coated 81 milliGRAM(s) Oral daily  atorvastatin 20 milliGRAM(s) Oral at bedtime  enoxaparin Injectable 40 milliGRAM(s) SubCutaneous every 24 hours  levothyroxine 50 MICROGram(s) Oral <User Schedule>  levothyroxine 75 MICROGram(s) Oral <User Schedule>  losartan 100 milliGRAM(s) Oral daily  melatonin 3 milliGRAM(s) Oral at bedtime PRN  metoprolol succinate ER 25 milliGRAM(s) Oral daily  ondansetron Injectable 4 milliGRAM(s) IV Push every 8 hours PRN      Review of Systems:  Review of systems otherwise negative except as previously noted.    Allergies: No Known Allergies    For details regarding the patient's past medical history, social history, family history, and other miscellaneous elements, please refer the initial infectious diseases consultation and/or the admitting history and physical examination for this admission.    Objective:  Vitals:   T(C): 36.8 (08-22-23 @ 12:04), Max: 36.8 (08-22-23 @ 12:04)  HR: 65 (08-22-23 @ 12:04) (64 - 65)  BP: 135/68 (08-22-23 @ 12:04) (135/68 - 158/68)  RR: 18 (08-22-23 @ 12:04) (17 - 18)  SpO2: 96% (08-22-23 @ 12:04) (93% - 96%)    Physical Examination:  General: no acute distress  HEENT: NC/AT, EOMI,  Cardio: S1, S2 heard, RRR, no murmurs  Resp: breath sounds heard bilaterally, no rales, wheezes or rhonchi  Abd: soft, NT, ND  Ext: no edema or cyanosis  Skin: warm, dry, no visible rash      Laboratory Studies:  CBC:                       12.3   6.50  )-----------( 174      ( 22 Aug 2023 07:02 )             35.0     CMP: 08-22    133<L>  |  102  |  6<L>  ----------------------------<  92  3.5   |  25  |  0.57    Ca    8.7      22 Aug 2023 07:02        Urinalysis Basic - ( 22 Aug 2023 07:02 )    Color: x / Appearance: x / SG: x / pH: x  Gluc: 92 mg/dL / Ketone: x  / Bili: x / Urobili: x   Blood: x / Protein: x / Nitrite: x   Leuk Esterase: x / RBC: x / WBC x   Sq Epi: x / Non Sq Epi: x / Bacteria: x        Microbiology: reviewed    Culture - Urine (collected 08-20-23 @ 08:50)  Source: Clean Catch Clean Catch (Midstream)  Final Report (08-21-23 @ 20:01):    Culture grew 3 or more types of organisms which indicate    collection contamination; consider recollection only if clinically    indicated.          Radiology: reviewed

## 2023-08-22 NOTE — DISCHARGE NOTE PROVIDER - CARE PROVIDER_API CALL
Pedro Arroyo  Internal Medicine  [address], [address] [place] [address]  Phone: [phone]  Fax: [phone]  Follow Up Time:    Closure 4 Information: This tab is for additional flaps and grafts above and beyond our usual structured repairs.  Please note if you enter information here it will not currently bill and you will need to add the billing information manually.

## 2023-08-22 NOTE — DISCHARGE NOTE PROVIDER - NSDCMRMEDTOKEN_GEN_ALL_CORE_FT
aspirin 81 mg oral tablet: 1 orally once a day  levothyroxine 50 mcg (0.05 mg) oral tablet: 1 tab(s) orally every other day  levothyroxine 75 mcg (0.075 mg) oral tablet: 1 tab(s) orally every other day  losartan 100 mg oral tablet: 1 orally once a day  lovastatin 40 mg oral tablet: 1 tab(s) orally once a day  Norvasc 2.5 mg oral tablet: 1 orally once a day (at bedtime)  Toprol-XL 25 mg oral tablet, extended release: 1 orally once a day

## 2023-08-22 NOTE — DISCHARGE NOTE PROVIDER - NSDCCPCAREPLAN_GEN_ALL_CORE_FT
PRINCIPAL DISCHARGE DIAGNOSIS  Diagnosis: Syncope  Assessment and Plan of Treatment:       SECONDARY DISCHARGE DIAGNOSES  Diagnosis: Dizziness  Assessment and Plan of Treatment:     Diagnosis: Hyponatremia  Assessment and Plan of Treatment:

## 2023-08-22 NOTE — DISCHARGE NOTE NURSING/CASE MANAGEMENT/SOCIAL WORK - PATIENT PORTAL LINK FT
You can access the FollowMyHealth Patient Portal offered by [facility] by registering at the following website: [email or web address]. By joining [employer]’s FollowMyHealth portal, you will also be able to view your health information using other applications (apps) compatible with our system.

## 2023-08-22 NOTE — CASE MANAGEMENT PROGRESS NOTE - NSCMPROGRESSNOTE_GEN_ALL_CORE
Patient is for transition home today. CM spoke to patient's daughter Zina  and notice of discharge given over the phone. Patient has no Skilled needs.

## 2024-01-17 ENCOUNTER — EMERGENCY (EMERGENCY)
Facility: HOSPITAL | Age: 89
LOS: 1 days | Discharge: HOME | End: 2024-01-17
Attending: EMERGENCY MEDICINE | Admitting: EMERGENCY MEDICINE
Payer: MEDICARE

## 2024-01-17 VITALS
SYSTOLIC BLOOD PRESSURE: 119 MMHG | OXYGEN SATURATION: 98 % | HEIGHT: 59 IN | RESPIRATION RATE: 17 BRPM | TEMPERATURE: 98 F | WEIGHT: 111.99 LBS | DIASTOLIC BLOOD PRESSURE: 64 MMHG | HEART RATE: 61 BPM

## 2024-01-17 VITALS
DIASTOLIC BLOOD PRESSURE: 67 MMHG | OXYGEN SATURATION: 96 % | RESPIRATION RATE: 16 BRPM | TEMPERATURE: 98 F | SYSTOLIC BLOOD PRESSURE: 148 MMHG | HEART RATE: 70 BPM

## 2024-01-17 LAB
ALBUMIN SERPL ELPH-MCNC: 3 G/DL — LOW (ref 3.3–5)
ALP SERPL-CCNC: 51 U/L — SIGNIFICANT CHANGE UP (ref 40–120)
ALT FLD-CCNC: 11 U/L — LOW (ref 12–78)
ANION GAP SERPL CALC-SCNC: 9 MMOL/L — SIGNIFICANT CHANGE UP (ref 5–17)
APPEARANCE UR: CLEAR — SIGNIFICANT CHANGE UP
AST SERPL-CCNC: 18 U/L — SIGNIFICANT CHANGE UP (ref 15–37)
BASOPHILS # BLD AUTO: 0.02 K/UL — SIGNIFICANT CHANGE UP (ref 0–0.2)
BASOPHILS NFR BLD AUTO: 0.2 % — SIGNIFICANT CHANGE UP (ref 0–2)
BILIRUB SERPL-MCNC: 0.6 MG/DL — SIGNIFICANT CHANGE UP (ref 0.2–1.2)
BILIRUB UR-MCNC: NEGATIVE — SIGNIFICANT CHANGE UP
BUN SERPL-MCNC: 11 MG/DL — SIGNIFICANT CHANGE UP (ref 7–23)
CALCIUM SERPL-MCNC: 8.5 MG/DL — SIGNIFICANT CHANGE UP (ref 8.5–10.1)
CHLORIDE SERPL-SCNC: 107 MMOL/L — SIGNIFICANT CHANGE UP (ref 96–108)
CO2 SERPL-SCNC: 23 MMOL/L — SIGNIFICANT CHANGE UP (ref 22–31)
COLOR SPEC: YELLOW — SIGNIFICANT CHANGE UP
CREAT SERPL-MCNC: 0.77 MG/DL — SIGNIFICANT CHANGE UP (ref 0.5–1.3)
DIFF PNL FLD: NEGATIVE — SIGNIFICANT CHANGE UP
EGFR: 74 ML/MIN/1.73M2 — SIGNIFICANT CHANGE UP
EOSINOPHIL # BLD AUTO: 0.06 K/UL — SIGNIFICANT CHANGE UP (ref 0–0.5)
EOSINOPHIL NFR BLD AUTO: 0.7 % — SIGNIFICANT CHANGE UP (ref 0–6)
FLUAV AG NPH QL: SIGNIFICANT CHANGE UP
FLUBV AG NPH QL: SIGNIFICANT CHANGE UP
GLUCOSE SERPL-MCNC: 119 MG/DL — HIGH (ref 70–99)
GLUCOSE UR QL: NEGATIVE MG/DL — SIGNIFICANT CHANGE UP
HCT VFR BLD CALC: 35.7 % — SIGNIFICANT CHANGE UP (ref 34.5–45)
HGB BLD-MCNC: 11.8 G/DL — SIGNIFICANT CHANGE UP (ref 11.5–15.5)
IMM GRANULOCYTES NFR BLD AUTO: 0.7 % — SIGNIFICANT CHANGE UP (ref 0–0.9)
KETONES UR-MCNC: NEGATIVE MG/DL — SIGNIFICANT CHANGE UP
LEUKOCYTE ESTERASE UR-ACNC: NEGATIVE — SIGNIFICANT CHANGE UP
LYMPHOCYTES # BLD AUTO: 1.78 K/UL — SIGNIFICANT CHANGE UP (ref 1–3.3)
LYMPHOCYTES # BLD AUTO: 21.6 % — SIGNIFICANT CHANGE UP (ref 13–44)
MCHC RBC-ENTMCNC: 30.1 PG — SIGNIFICANT CHANGE UP (ref 27–34)
MCHC RBC-ENTMCNC: 33.1 GM/DL — SIGNIFICANT CHANGE UP (ref 32–36)
MCV RBC AUTO: 91.1 FL — SIGNIFICANT CHANGE UP (ref 80–100)
MONOCYTES # BLD AUTO: 0.78 K/UL — SIGNIFICANT CHANGE UP (ref 0–0.9)
MONOCYTES NFR BLD AUTO: 9.5 % — SIGNIFICANT CHANGE UP (ref 2–14)
NEUTROPHILS # BLD AUTO: 5.53 K/UL — SIGNIFICANT CHANGE UP (ref 1.8–7.4)
NEUTROPHILS NFR BLD AUTO: 67.3 % — SIGNIFICANT CHANGE UP (ref 43–77)
NITRITE UR-MCNC: NEGATIVE — SIGNIFICANT CHANGE UP
NRBC # BLD: 0 /100 WBCS — SIGNIFICANT CHANGE UP (ref 0–0)
PH UR: 6 — SIGNIFICANT CHANGE UP (ref 5–8)
PLATELET # BLD AUTO: 264 K/UL — SIGNIFICANT CHANGE UP (ref 150–400)
POTASSIUM SERPL-MCNC: 4 MMOL/L — SIGNIFICANT CHANGE UP (ref 3.5–5.3)
POTASSIUM SERPL-SCNC: 4 MMOL/L — SIGNIFICANT CHANGE UP (ref 3.5–5.3)
PROT SERPL-MCNC: 6.3 G/DL — SIGNIFICANT CHANGE UP (ref 6–8.3)
PROT UR-MCNC: NEGATIVE MG/DL — SIGNIFICANT CHANGE UP
RBC # BLD: 3.92 M/UL — SIGNIFICANT CHANGE UP (ref 3.8–5.2)
RBC # FLD: 13.2 % — SIGNIFICANT CHANGE UP (ref 10.3–14.5)
RSV RNA NPH QL NAA+NON-PROBE: SIGNIFICANT CHANGE UP
SARS-COV-2 RNA SPEC QL NAA+PROBE: SIGNIFICANT CHANGE UP
SODIUM SERPL-SCNC: 139 MMOL/L — SIGNIFICANT CHANGE UP (ref 135–145)
SP GR SPEC: 1.01 — SIGNIFICANT CHANGE UP (ref 1–1.03)
TROPONIN I, HIGH SENSITIVITY RESULT: 6.5 NG/L — SIGNIFICANT CHANGE UP
UROBILINOGEN FLD QL: 0.2 MG/DL — SIGNIFICANT CHANGE UP (ref 0.2–1)
WBC # BLD: 8.23 K/UL — SIGNIFICANT CHANGE UP (ref 3.8–10.5)
WBC # FLD AUTO: 8.23 K/UL — SIGNIFICANT CHANGE UP (ref 3.8–10.5)

## 2024-01-17 PROCEDURE — 93005 ELECTROCARDIOGRAM TRACING: CPT

## 2024-01-17 PROCEDURE — 87086 URINE CULTURE/COLONY COUNT: CPT

## 2024-01-17 PROCEDURE — 99285 EMERGENCY DEPT VISIT HI MDM: CPT

## 2024-01-17 PROCEDURE — 99285 EMERGENCY DEPT VISIT HI MDM: CPT | Mod: 25

## 2024-01-17 PROCEDURE — 36415 COLL VENOUS BLD VENIPUNCTURE: CPT

## 2024-01-17 PROCEDURE — 85025 COMPLETE CBC W/AUTO DIFF WBC: CPT

## 2024-01-17 PROCEDURE — 71045 X-RAY EXAM CHEST 1 VIEW: CPT | Mod: 26

## 2024-01-17 PROCEDURE — 96360 HYDRATION IV INFUSION INIT: CPT

## 2024-01-17 PROCEDURE — 80053 COMPREHEN METABOLIC PANEL: CPT

## 2024-01-17 PROCEDURE — 87637 SARSCOV2&INF A&B&RSV AMP PRB: CPT

## 2024-01-17 PROCEDURE — 93010 ELECTROCARDIOGRAM REPORT: CPT

## 2024-01-17 PROCEDURE — 71045 X-RAY EXAM CHEST 1 VIEW: CPT

## 2024-01-17 PROCEDURE — 84484 ASSAY OF TROPONIN QUANT: CPT

## 2024-01-17 PROCEDURE — 81003 URINALYSIS AUTO W/O SCOPE: CPT

## 2024-01-17 RX ORDER — SODIUM CHLORIDE 9 MG/ML
500 INJECTION INTRAMUSCULAR; INTRAVENOUS; SUBCUTANEOUS ONCE
Refills: 0 | Status: COMPLETED | OUTPATIENT
Start: 2024-01-17 | End: 2024-01-17

## 2024-01-17 RX ORDER — SODIUM CHLORIDE 9 MG/ML
1000 INJECTION INTRAMUSCULAR; INTRAVENOUS; SUBCUTANEOUS ONCE
Refills: 0 | Status: DISCONTINUED | OUTPATIENT
Start: 2024-01-17 | End: 2024-01-17

## 2024-01-17 RX ADMIN — SODIUM CHLORIDE 500 MILLILITER(S): 9 INJECTION INTRAMUSCULAR; INTRAVENOUS; SUBCUTANEOUS at 19:31

## 2024-01-17 RX ADMIN — SODIUM CHLORIDE 500 MILLILITER(S): 9 INJECTION INTRAMUSCULAR; INTRAVENOUS; SUBCUTANEOUS at 20:32

## 2024-01-17 NOTE — ED ADULT NURSE NOTE - NSFALLRISKINTERV_ED_ALL_ED

## 2024-01-17 NOTE — ED PROVIDER NOTE - PATIENT PORTAL LINK FT
You can access the FollowMyHealth Patient Portal offered by [facility] by registering at the following website: [email or web address]. By joining [employer]’s FollowMyHealth portal, you will also be able to view your health information using other applications (apps) compatible with our system.

## 2024-01-17 NOTE — ED ADULT NURSE NOTE - OBJECTIVE STATEMENT
patient brought in from home with SOB. patient received flu shot 3 days before robel and has not felt well since. patient is weak, dizzy, and tired.  patient spo2 99% on RA

## 2024-01-17 NOTE — ED PROVIDER NOTE - OBJECTIVE STATEMENT
89-year-old female significant past medical history for hypertension, hyperlipidemia, coronary artery disease, hypothyroidism presents to the ED with complaints of generalized weakness x 2 weeks after receiving the flu vaccine prior to Christmas. Patient reports she felt short of breath today and reports a mild cough yesterday.  Patient denies any fevers, CP, n/v/d, abd pain or urinary sympts.

## 2024-01-17 NOTE — ED PROVIDER NOTE - NSFOLLOWUPINSTRUCTIONS_ED_ALL_ED_FT
Weakness  Weakness is a lack of strength. You may feel weak all over your body (generalized), or you may feel weak in one part of your body (focal). There are many potential causes of weakness. Sometimes, the cause of your weakness may not be known. Some causes of weakness can be serious, so it is important to see your doctor.    Follow these instructions at home:  Activity    Rest as needed.  Try to get enough sleep. Most adults need 7–8 hours of sleep each night. Talk to your doctor about how much sleep you need.  Do exercises, such as arm curls and leg raises, for 30 minutes at least 2 days a week or as told by your doctor.  Think about working with a physical therapist or  to help you get stronger.  General instructions    A plate with examples of foods in a healthy diet.  Take over-the-counter and prescription medicines only as told by your doctor.  Eat a healthy, well-balanced diet. This includes:  Proteins to build muscles, such as lean meats and fish.  Fresh fruits and vegetables.  Carbohydrates to boost energy, such as whole grains.  Drink enough fluid to keep your pee (urine) pale yellow.  Keep all follow-up visits.  Contact a doctor if:  Your weakness does not get better or it gets worse.  Your weakness affects your ability to:  Think clearly.  Do your normal daily activities.  Get help right away if:  You have sudden weakness on one side of your face or body.  You have chest pain.  You have trouble breathing or shortness of breath.  You have problems with how you see (vision).  You have trouble talking or swallowing.  You have trouble standing or walking.  You are light-headed or faint.  These symptoms may be an emergency. Get help right away. Call 911.  Do not wait to see if the symptoms will go away.  Do not drive yourself to the hospital.  Summary  Weakness is a lack of strength. You may feel weak all over your body or just in one part of your body.  There are many potential causes of weakness. Sometimes, the cause of your weakness may not be known.  Rest as needed, and try to get enough sleep. Most adults need 7–8 hours of sleep each night.  Eat a healthy, well-balanced diet.  This information is not intended to replace advice given to you by your health care provider. Make sure you discuss any questions you have with your health care provider.    Document Revised: 11/20/2022 Document Reviewed: 11/20/2022  Elsevier Patient Education © 2023 [employer]

## 2024-01-17 NOTE — ED PROVIDER NOTE - CLINICAL SUMMARY MEDICAL DECISION MAKING FREE TEXT BOX
89-year-old female significant past medical history for hypertension, hyperlipidemia, coronary artery disease, hypothyroidism presents to the ED with complaints of generalized weakness x 2 weeks after receiving the flu vaccine prior to Julieth.  Patient complaining of shortness of breath x 1 day.  Patient denies any cough, fevers.  Labs rule out infectious etiology, troponin, EKG rule out ACS.

## 2024-01-17 NOTE — ED PROVIDER NOTE - PHYSICAL EXAMINATION
Gen: Well appearing in NAD.   Head: atraumatic  Heart: s1/s2, RRR  Lung: CTA b/l, no wheezing/rhonchi or rales.  Abd: soft, NT/ND, no rebound or guarding. NCVAT  Msk: no pedal edema   Neuro: AAO x3, patient moving all extremity equally, no focal neuro deficits noted  Skin: Normal for race.  Psych: Alert and oriented

## 2024-01-19 LAB
CULTURE RESULTS: SIGNIFICANT CHANGE UP
SPECIMEN SOURCE: SIGNIFICANT CHANGE UP

## 2024-02-11 NOTE — ED ADULT TRIAGE NOTE - WEIGHT IN LBS
Care Due:                  Date            Visit Type   Department     Provider  --------------------------------------------------------------------------------                                EP -                              PRIMARY      [facility]  Last Visit: 03-      CARE ([facility])   MEDICINE       Bambi Bucio  Next Visit: None Scheduled  None         None Found                                                            Last  Test          Frequency    Reason                     Performed    Due Date  --------------------------------------------------------------------------------    CMP.........  12 months..  hydroCHLOROthiazide......  02-   02-    [facility] Embedded Care Due Messages. Reference number: [id number].   2/11/2024 6:49:41 AM CST   119

## 2025-07-23 ENCOUNTER — APPOINTMENT (OUTPATIENT)
Dept: OBGYN | Facility: CLINIC | Age: 89
End: 2025-07-23
Payer: MEDICARE

## 2025-07-23 VITALS
HEART RATE: 84 BPM | BODY MASS INDEX: 23.18 KG/M2 | HEIGHT: 59 IN | SYSTOLIC BLOOD PRESSURE: 150 MMHG | DIASTOLIC BLOOD PRESSURE: 72 MMHG | WEIGHT: 115 LBS

## 2025-07-23 DIAGNOSIS — Z87.448 PERSONAL HISTORY OF OTHER DISEASES OF URINARY SYSTEM: ICD-10-CM

## 2025-07-23 DIAGNOSIS — Z46.89 ENCOUNTER FOR FITTING AND ADJUSTMENT OF OTHER SPECIFIED DEVICES: ICD-10-CM

## 2025-07-23 DIAGNOSIS — N81.4 UTEROVAGINAL PROLAPSE, UNSPECIFIED: ICD-10-CM

## 2025-07-23 DIAGNOSIS — R39.9 UNSPECIFIED SYMPTOMS AND SIGNS INVOLVING THE GENITOURINARY SYSTEM: ICD-10-CM

## 2025-07-23 PROCEDURE — 99214 OFFICE O/P EST MOD 30 MIN: CPT

## 2025-07-25 PROBLEM — R39.9 UTI SYMPTOMS: Status: RESOLVED | Noted: 2020-07-22 | Resolved: 2025-07-25

## 2025-07-25 PROBLEM — Z87.448 HISTORY OF PROLAPSE OF BLADDER: Status: RESOLVED | Noted: 2019-11-05 | Resolved: 2025-07-25

## 2025-07-25 PROBLEM — Z46.89 PESSARY MAINTENANCE: Status: RESOLVED | Noted: 2017-12-15 | Resolved: 2025-07-25

## 2025-08-26 ENCOUNTER — APPOINTMENT (OUTPATIENT)
Dept: OBGYN | Facility: CLINIC | Age: 89
End: 2025-08-26
Payer: MEDICARE

## 2025-08-26 VITALS
SYSTOLIC BLOOD PRESSURE: 187 MMHG | BODY MASS INDEX: 22.38 KG/M2 | HEIGHT: 59 IN | HEART RATE: 88 BPM | DIASTOLIC BLOOD PRESSURE: 65 MMHG | WEIGHT: 111 LBS

## 2025-08-26 DIAGNOSIS — R35.0 FREQUENCY OF MICTURITION: ICD-10-CM

## 2025-08-26 DIAGNOSIS — N81.4 UTEROVAGINAL PROLAPSE, UNSPECIFIED: ICD-10-CM

## 2025-08-26 DIAGNOSIS — R39.9 UNSPECIFIED SYMPTOMS AND SIGNS INVOLVING THE GENITOURINARY SYSTEM: ICD-10-CM

## 2025-08-26 PROCEDURE — 99213 OFFICE O/P EST LOW 20 MIN: CPT

## 2025-08-26 RX ORDER — CIPROFLOXACIN HYDROCHLORIDE 250 MG/1
250 TABLET, FILM COATED ORAL
Qty: 10 | Refills: 0 | Status: ACTIVE | COMMUNITY
Start: 2025-08-26 | End: 1900-01-01

## 2025-08-29 ENCOUNTER — NON-APPOINTMENT (OUTPATIENT)
Age: 89
End: 2025-08-29

## 2025-09-01 LAB
BACTERIA UR CULT: NORMAL
CANDIDA VAG CYTO: NOT DETECTED
G VAGINALIS+PREV SP MTYP VAG QL MICRO: NOT DETECTED
T VAGINALIS VAG QL WET PREP: NOT DETECTED